# Patient Record
Sex: FEMALE | Race: WHITE | NOT HISPANIC OR LATINO | Employment: OTHER | URBAN - NONMETROPOLITAN AREA
[De-identification: names, ages, dates, MRNs, and addresses within clinical notes are randomized per-mention and may not be internally consistent; named-entity substitution may affect disease eponyms.]

---

## 2017-05-02 ENCOUNTER — OFFICE VISIT (OUTPATIENT)
Dept: CARDIOLOGY | Facility: CLINIC | Age: 72
End: 2017-05-02

## 2017-05-02 VITALS
HEIGHT: 67 IN | BODY MASS INDEX: 31.48 KG/M2 | HEART RATE: 81 BPM | SYSTOLIC BLOOD PRESSURE: 158 MMHG | OXYGEN SATURATION: 95 % | DIASTOLIC BLOOD PRESSURE: 69 MMHG | WEIGHT: 200.6 LBS

## 2017-05-02 DIAGNOSIS — Z82.49 FAMILY HISTORY OF CORONARY ARTERY DISEASE: ICD-10-CM

## 2017-05-02 DIAGNOSIS — I65.29 STENOSIS OF CAROTID ARTERY, UNSPECIFIED LATERALITY: ICD-10-CM

## 2017-05-02 DIAGNOSIS — Z92.3 S/P RADIATION THERAPY: ICD-10-CM

## 2017-05-02 DIAGNOSIS — R68.89 ANERGY: ICD-10-CM

## 2017-05-02 DIAGNOSIS — M25.511 PAIN OF BOTH SHOULDER JOINTS: ICD-10-CM

## 2017-05-02 DIAGNOSIS — Z86.73 HISTORY OF TIA (TRANSIENT ISCHEMIC ATTACK): ICD-10-CM

## 2017-05-02 DIAGNOSIS — R55 SYNCOPE, UNSPECIFIED SYNCOPE TYPE: ICD-10-CM

## 2017-05-02 DIAGNOSIS — M25.512 PAIN OF BOTH SHOULDER JOINTS: ICD-10-CM

## 2017-05-02 DIAGNOSIS — R06.02 SHORTNESS OF BREATH: ICD-10-CM

## 2017-05-02 DIAGNOSIS — I10 ESSENTIAL HYPERTENSION: ICD-10-CM

## 2017-05-02 DIAGNOSIS — E78.2 MIXED HYPERLIPIDEMIA: Primary | ICD-10-CM

## 2017-05-02 DIAGNOSIS — R42 DIZZINESS: ICD-10-CM

## 2017-05-02 DIAGNOSIS — R53.82 CHRONIC FATIGUE: ICD-10-CM

## 2017-05-02 DIAGNOSIS — E78.2 MIXED HYPERLIPIDEMIA: ICD-10-CM

## 2017-05-02 DIAGNOSIS — R09.89 RALES: ICD-10-CM

## 2017-05-02 PROBLEM — E07.9 THYROID DISEASE: Status: ACTIVE | Noted: 2017-05-02

## 2017-05-02 PROBLEM — T66.XXXA RADIATION THERAPY COMPLICATION: Status: ACTIVE | Noted: 2017-05-02

## 2017-05-02 PROBLEM — E11.9 DIABETES (HCC): Status: ACTIVE | Noted: 2017-05-02

## 2017-05-02 PROBLEM — M25.50 JOINT PAIN: Status: ACTIVE | Noted: 2017-05-02

## 2017-05-02 PROCEDURE — 99205 OFFICE O/P NEW HI 60 MIN: CPT | Performed by: INTERNAL MEDICINE

## 2017-05-02 RX ORDER — PRAVASTATIN SODIUM 20 MG
20 TABLET ORAL DAILY
COMMUNITY

## 2017-05-02 RX ORDER — ACETAMINOPHEN 160 MG
2000 TABLET,DISINTEGRATING ORAL DAILY
COMMUNITY

## 2017-05-02 RX ORDER — LISINOPRIL 10 MG/1
10 TABLET ORAL DAILY
Qty: 30 TABLET | Refills: 5 | Status: SHIPPED | OUTPATIENT
Start: 2017-05-02 | End: 2017-10-18 | Stop reason: SDUPTHER

## 2017-05-02 RX ORDER — LISINOPRIL 5 MG/1
5 TABLET ORAL DAILY
COMMUNITY
End: 2017-05-02

## 2017-05-02 RX ORDER — LEVOTHYROXINE SODIUM 88 UG/1
88 TABLET ORAL DAILY
COMMUNITY

## 2017-05-02 RX ORDER — GLIMEPIRIDE 2 MG/1
2 TABLET ORAL
COMMUNITY
End: 2019-06-20 | Stop reason: DRUGHIGH

## 2017-05-02 RX ORDER — ASPIRIN 325 MG
325 TABLET ORAL DAILY
COMMUNITY

## 2017-05-02 RX ORDER — AMLODIPINE BESYLATE 2.5 MG/1
2.5 TABLET ORAL DAILY
Qty: 30 TABLET | Refills: 11 | Status: SHIPPED | OUTPATIENT
Start: 2017-05-02 | End: 2018-02-16 | Stop reason: SDUPTHER

## 2017-05-02 RX ORDER — BRIMONIDINE TARTRATE 0.15 %
1 DROPS OPHTHALMIC (EYE) 3 TIMES DAILY
COMMUNITY
End: 2021-08-31 | Stop reason: ALTCHOICE

## 2017-05-02 RX ORDER — OMEPRAZOLE 40 MG/1
40 CAPSULE, DELAYED RELEASE ORAL DAILY
COMMUNITY

## 2017-05-11 ENCOUNTER — HOSPITAL ENCOUNTER (OUTPATIENT)
Dept: CARDIOLOGY | Facility: HOSPITAL | Age: 72
Discharge: HOME | End: 2017-05-11
Attending: INTERNAL MEDICINE

## 2017-05-11 ENCOUNTER — HOSPITAL ENCOUNTER (OUTPATIENT)
Dept: CARDIOLOGY | Facility: HOSPITAL | Age: 72
Discharge: HOME | End: 2017-05-11

## 2017-05-11 ENCOUNTER — OUTSIDE FACILITY SERVICE (OUTPATIENT)
Dept: CARDIOLOGY | Facility: CLINIC | Age: 72
End: 2017-05-11

## 2017-05-11 LAB
MAXIMAL PREDICTED HEART RATE: 147 BPM
STRESS TARGET HR: 125 BPM

## 2017-05-11 PROCEDURE — 93880 EXTRACRANIAL BILAT STUDY: CPT

## 2017-05-11 PROCEDURE — 78452 HT MUSCLE IMAGE SPECT MULT: CPT

## 2017-05-11 PROCEDURE — 25010000002 REGADENOSON 0.4 MG/5ML SOLUTION: Performed by: INTERNAL MEDICINE

## 2017-05-11 PROCEDURE — 93018 CV STRESS TEST I&R ONLY: CPT | Performed by: INTERNAL MEDICINE

## 2017-05-11 PROCEDURE — 78452 HT MUSCLE IMAGE SPECT MULT: CPT | Performed by: INTERNAL MEDICINE

## 2017-05-11 PROCEDURE — 0 TECHNETIUM SESTAMIBI: Performed by: INTERNAL MEDICINE

## 2017-05-11 PROCEDURE — 93017 CV STRESS TEST TRACING ONLY: CPT

## 2017-05-11 PROCEDURE — 93880 EXTRACRANIAL BILAT STUDY: CPT | Performed by: INTERNAL MEDICINE

## 2017-05-11 PROCEDURE — 93306 TTE W/DOPPLER COMPLETE: CPT | Performed by: INTERNAL MEDICINE

## 2017-05-11 PROCEDURE — A9500 TC99M SESTAMIBI: HCPCS | Performed by: INTERNAL MEDICINE

## 2017-05-11 RX ADMIN — Medication 1 DOSE: at 13:45

## 2017-05-11 RX ADMIN — REGADENOSON 0.4 MG: 0.08 INJECTION, SOLUTION INTRAVENOUS at 13:45

## 2017-05-15 ENCOUNTER — DOCUMENTATION (OUTPATIENT)
Dept: CARDIOLOGY | Facility: CLINIC | Age: 72
End: 2017-05-15

## 2017-05-16 ENCOUNTER — DOCUMENTATION (OUTPATIENT)
Dept: CARDIOLOGY | Facility: CLINIC | Age: 72
End: 2017-05-16

## 2017-05-18 ENCOUNTER — OUTSIDE FACILITY SERVICE (OUTPATIENT)
Dept: CARDIOLOGY | Facility: CLINIC | Age: 72
End: 2017-05-18

## 2017-05-18 PROCEDURE — 93228 REMOTE 30 DAY ECG REV/REPORT: CPT | Performed by: INTERNAL MEDICINE

## 2017-05-24 ENCOUNTER — TELEPHONE (OUTPATIENT)
Dept: CARDIOLOGY | Facility: CLINIC | Age: 72
End: 2017-05-24

## 2017-05-24 DIAGNOSIS — R09.89 BILATERAL CAROTID BRUITS: ICD-10-CM

## 2017-05-24 DIAGNOSIS — R94.39 ABNORMAL CAROTID DUPLEX SCAN: Primary | ICD-10-CM

## 2017-05-24 DIAGNOSIS — Z79.899 LONG TERM USE OF DRUG: ICD-10-CM

## 2017-06-19 ENCOUNTER — OFFICE VISIT (OUTPATIENT)
Dept: CARDIOLOGY | Facility: CLINIC | Age: 72
End: 2017-06-19

## 2017-06-19 VITALS
DIASTOLIC BLOOD PRESSURE: 64 MMHG | SYSTOLIC BLOOD PRESSURE: 119 MMHG | WEIGHT: 199.2 LBS | HEIGHT: 67 IN | HEART RATE: 72 BPM | BODY MASS INDEX: 31.27 KG/M2 | OXYGEN SATURATION: 98 %

## 2017-06-19 DIAGNOSIS — R06.02 SHORTNESS OF BREATH: ICD-10-CM

## 2017-06-19 DIAGNOSIS — E78.2 MIXED HYPERLIPIDEMIA: ICD-10-CM

## 2017-06-19 DIAGNOSIS — I65.23 BILATERAL CAROTID ARTERY STENOSIS: ICD-10-CM

## 2017-06-19 DIAGNOSIS — R42 DIZZINESS: ICD-10-CM

## 2017-06-19 DIAGNOSIS — Z86.73 HISTORY OF TIA (TRANSIENT ISCHEMIC ATTACK): ICD-10-CM

## 2017-06-19 DIAGNOSIS — I10 ESSENTIAL HYPERTENSION: Primary | ICD-10-CM

## 2017-06-19 DIAGNOSIS — R53.82 CHRONIC FATIGUE: ICD-10-CM

## 2017-06-19 DIAGNOSIS — R55 SYNCOPE, UNSPECIFIED SYNCOPE TYPE: ICD-10-CM

## 2017-06-19 PROCEDURE — 99214 OFFICE O/P EST MOD 30 MIN: CPT | Performed by: INTERNAL MEDICINE

## 2017-10-18 RX ORDER — LISINOPRIL 10 MG/1
TABLET ORAL
Qty: 90 TABLET | Refills: 5 | Status: SHIPPED | OUTPATIENT
Start: 2017-10-18 | End: 2018-10-22 | Stop reason: SDUPTHER

## 2018-02-19 RX ORDER — AMLODIPINE BESYLATE 2.5 MG/1
TABLET ORAL
Qty: 90 TABLET | Refills: 11 | Status: SHIPPED | OUTPATIENT
Start: 2018-02-19 | End: 2019-03-20 | Stop reason: SDUPTHER

## 2018-06-19 ENCOUNTER — OFFICE VISIT (OUTPATIENT)
Dept: CARDIOLOGY | Facility: CLINIC | Age: 73
End: 2018-06-19

## 2018-06-19 VITALS
HEIGHT: 67 IN | WEIGHT: 194.2 LBS | OXYGEN SATURATION: 96 % | DIASTOLIC BLOOD PRESSURE: 64 MMHG | BODY MASS INDEX: 30.48 KG/M2 | HEART RATE: 71 BPM | SYSTOLIC BLOOD PRESSURE: 109 MMHG

## 2018-06-19 DIAGNOSIS — R01.1 SYSTOLIC EJECTION MURMUR: ICD-10-CM

## 2018-06-19 DIAGNOSIS — R06.02 SHORTNESS OF BREATH: ICD-10-CM

## 2018-06-19 DIAGNOSIS — R53.82 CHRONIC FATIGUE: ICD-10-CM

## 2018-06-19 DIAGNOSIS — I10 ESSENTIAL HYPERTENSION: Primary | ICD-10-CM

## 2018-06-19 DIAGNOSIS — E78.2 MIXED HYPERLIPIDEMIA: ICD-10-CM

## 2018-06-19 DIAGNOSIS — R42 DIZZINESS: ICD-10-CM

## 2018-06-19 DIAGNOSIS — R94.31 LEFT AXIS DEVIATION: ICD-10-CM

## 2018-06-19 PROCEDURE — 99214 OFFICE O/P EST MOD 30 MIN: CPT | Performed by: INTERNAL MEDICINE

## 2018-06-19 PROCEDURE — 93000 ELECTROCARDIOGRAM COMPLETE: CPT | Performed by: INTERNAL MEDICINE

## 2018-06-19 NOTE — PROGRESS NOTES
Subjective   Deborah Corrales is a 72 y.o. female     Chief Complaint   Patient presents with   • Hypertension     presents as a 1 year follow up   • Hyperlipidemia       PROBLEM LIST:   1. Fatigue   2. Hyperlipidemia   3. Hypertension   4. Thyroid disease   5. H/o TIA  6. Diabetes   7. Family H/o CAD   8. Carotid artery stenosis   8.1 carotid duplex 05/02/17 Potentially hemodynamically significant disease in the proximal right internal carotid artery. Although stenosis appears more the range of 50% by echo and Doppler sampling, Doppler velocities are compatible with stenosis in the range described above. Consider CTA for further Evaluation.  8.2 CTA neck 06/02/17 stenosis HECTOR approximal  50%, stenosis LICA less than 40%. No flow identified in right vertebral artery, left vertebral artery is patent and extends to fill the basilar artery.          Specialty Problems        Cardiology Problems    Carotid artery stenosis        Essential hypertension        Mixed hyperlipidemia        Syncope                HPI:  Ms. new returns for follow-up on the above problems.    She has done reasonably well since the time of her last visit.  She feels she has stable to perhaps slightly improved physical capacity and exertional dyspnea.  She currently denies chest pain.  She relates orthopnea PND or significant lower extremity edema.  She doesn't palpitate, but she continues to have positional dizziness which is somewhat improved with head positioning exercises to to extinguish positional vertigo.    Ms. Corrales Lost 5 pounds since time of her last visit.  She relates this to episodic nausea and vomiting postprandially.  Apparently workup for a specific cause was unremarkable.  She continues to be without symptoms of cerebral ischemia or other symptoms of peripheral arterial disease or of arterial embolic events.              CURRENT MEDICATION:    Current Outpatient Prescriptions   Medication Sig Dispense Refill   • amLODIPine (NORVASC)  2.5 MG tablet TAKE 1 TABLET EVERY DAY 90 tablet 11   • aspirin 325 MG tablet Take 325 mg by mouth Daily.     • bimatoprost (LUMIGAN) 0.01 % ophthalmic drops 1 drop Every Night.     • brimonidine (ALPHAGAN) 0.15 % ophthalmic solution 1 drop 3 (Three) Times a Day.     • cholecalciferol (VITAMIN D3) 1000 UNITS tablet Take 1,000 Units by mouth Daily.     • glimepiride (AMARYL) 2 MG tablet Take 2 mg by mouth Every Morning Before Breakfast. 1.5 tablets daily     • levothyroxine (SYNTHROID, LEVOTHROID) 88 MCG tablet Take 88 mcg by mouth Daily.     • lisinopril (PRINIVIL,ZESTRIL) 10 MG tablet TAKE 1 TABLET EVERY DAY 90 tablet 5   • omeprazole (priLOSEC) 40 MG capsule Take 40 mg by mouth Daily.     • pravastatin (PRAVACHOL) 20 MG tablet Take 20 mg by mouth Daily.       No current facility-administered medications for this visit.        ALLERGIES:    Patient has no known allergies.    PAST MEDICAL HISTORY:    Past Medical History:   Diagnosis Date   • Cancer    • Diabetes mellitus    • GERD (gastroesophageal reflux disease)    • Hyperlipidemia    • Hypertension    • Hypothyroidism    • Non Hodgkin's lymphoma    • Non Hodgkin's lymphoma    • Rupture of bowel     2014    • TIA (transient ischemic attack)        SURGICAL HISTORY:    Past Surgical History:   Procedure Laterality Date   • CATARACT EXTRACTION     • CHOLECYSTECTOMY OPEN     • EYE SURGERY     • TUMOR REMOVAL      under jaw       SOCIAL HISTORY:    Social History     Social History   • Marital status: Single     Spouse name: N/A   • Number of children: N/A   • Years of education: N/A     Occupational History   • Not on file.     Social History Main Topics   • Smoking status: Never Smoker   • Smokeless tobacco: Never Used   • Alcohol use No   • Drug use: No   • Sexual activity: Defer     Other Topics Concern   • Not on file     Social History Narrative   • No narrative on file       FAMILY HISTORY:    Family History   Problem Relation Age of Onset   • Heart disease  "Mother    • COPD Mother    • Pancreatic cancer Father    • Heart disease Maternal Uncle        Review of Systems   Constitutional: Positive for fatigue. Negative for chills, diaphoresis and fever.   HENT: Positive for trouble swallowing. Negative for congestion, nosebleeds, postnasal drip, rhinorrhea, sinus pain and sinus pressure.    Eyes: Positive for visual disturbance (glasses prn).   Respiratory: Positive for shortness of breath (mainly on exertion ) and wheezing ( occasional at night). Negative for apnea and chest tightness.    Cardiovascular: Negative.  Negative for chest pain (denies CP), palpitations (denies palps) and leg swelling.   Gastrointestinal: Positive for abdominal pain, constipation and vomiting. Negative for blood in stool, diarrhea and nausea.        GERD   Endocrine: Negative.  Negative for cold intolerance and heat intolerance.   Genitourinary: Negative.  Negative for hematuria.   Musculoskeletal: Positive for arthralgias (neck), myalgias (legs at night occas in toes), neck pain (neck spurs ) and neck stiffness. Negative for back pain, gait problem and joint swelling.   Skin: Negative.  Negative for color change, pallor, rash and wound.   Allergic/Immunologic: Positive for environmental allergies. Negative for food allergies.   Neurological: Positive for dizziness (episode last month with quick movement - has head exercise for relief) and numbness (occas in hands). Negative for tremors, seizures, syncope, speech difficulty, weakness, light-headedness and headaches.   Hematological: Negative.  Does not bruise/bleed easily.   Psychiatric/Behavioral: Negative for agitation, confusion and sleep disturbance (denies waking up smothering/soa). The patient is not nervous/anxious.        VISIT VITALS:  Vitals:    06/19/18 0921   BP: 109/64   BP Location: Left arm   Patient Position: Sitting   Pulse: 71   SpO2: 96%   Weight: 88.1 kg (194 lb 3.2 oz)   Height: 169.2 cm (66.6\")      /64 (BP " "Location: Left arm, Patient Position: Sitting)   Pulse 71   Ht 169.2 cm (66.6\")   Wt 88.1 kg (194 lb 3.2 oz)   SpO2 96%   BMI 30.78 kg/m²     RECENT LABS:    Objective       Physical Exam   Constitutional: She is oriented to person, place, and time. She appears well-developed and well-nourished. No distress.   HENT:   Head: Normocephalic and atraumatic.   Eyes: Conjunctivae, EOM and lids are normal. Pupils are equal, round, and reactive to light. Lids are everted and swept, no foreign bodies found.   Neck: Normal range of motion. Neck supple. Normal carotid pulses, no hepatojugular reflux and no JVD present. Carotid bruit is not present. No tracheal deviation present. No thyroid mass and no thyromegaly present.   Cardiovascular: Normal rate, regular rhythm, S2 normal and intact distal pulses.  Exam reveals gallop and S4. Exam reveals no S3, no distant heart sounds and no friction rub.    Murmur (No AI or MR by exam ) heard.   Systolic (1-2/6 KENDALL RUSB) murmur is present   Pulses:       Carotid pulses are 2+ on the right side, and 2+ on the left side.       Radial pulses are 2+ on the right side, and 2+ on the left side.        Dorsalis pedis pulses are 2+ on the right side, and 2+ on the left side.        Posterior tibial pulses are 2+ on the right side, and 2+ on the left side.   S1 decreased      Pulmonary/Chest: Effort normal and breath sounds normal. She has no decreased breath sounds. She has no wheezes. She has no rhonchi. She has no rales.   Abdominal: Soft. Bowel sounds are normal. She exhibits no distension, no pulsatile liver, no abdominal bruit, no pulsatile midline mass and no mass. There is no tenderness. There is no rebound and no guarding.   Negative organomegaly      Musculoskeletal: Normal range of motion. She exhibits no edema (trace edema BLE), tenderness or deformity.   Lymphadenopathy:     She has no cervical adenopathy.     She has no axillary adenopathy.   Neurological: She is alert and " oriented to person, place, and time.   Skin: Skin is warm and dry. No rash noted. She is not diaphoretic. No erythema. No pallor.   Psychiatric: She has a normal mood and affect. Her speech is normal and behavior is normal. Judgment and thought content normal. Cognition and memory are normal.   Nursing note and vitals reviewed.        ECG 12 Lead  Date/Time: 6/19/2018 9:38 AM  Performed by: JAMESON FRANKS  Authorized by: JAMESON FRANKS   Rhythm: sinus rhythm  Rate: normal  Conduction: LAFB  QRS axis: left                Assessment/Plan   #1.  Peripheral arterial disease with previous evaluation as described above.  The patient is asymptomatic from the standpoint of cerebral ischemia.  Blood pressures are controlled.  Consideration could be given to changing to high-dose statin with known peripheral arterial disease.  No further evaluation is indicated at this time in the absence of progressive symptoms.      #2.  Ms. miguel is also doing well from the perspective of cardiac risk factor modification.  Blood pressures are well controlled, medications are appropriate (see #1 above).    #3.  Therefore, we will make no changes.  Ms. miguel was encouraged to attempt to maintain her increase her physical capacity as a part of long-term cardiac risk factor modification.  She will follow-up with Dr. Nye per his instructions, and in our office in one year or on a when necessary basis for symptoms as discussed in detail today.   Diagnosis Plan   1. Essential hypertension  ECG 12 Lead   2. Mixed hyperlipidemia     3. Chronic fatigue     4. Shortness of breath     5. Dizziness     6. Left axis deviation     7. Systolic ejection murmur         Return in about 1 year (around 6/19/2019), or if symptoms worsen or fail to improve, for Next scheduled follow up.            Patient's Body mass index is 30.78 kg/m². BMI is above normal parameters. Recommendations include: educational material.       Jameson Franks MD   Scribed  for Dr. Jameson Franks by VITOR Mcbride June 19, 2018 7:20 PM               Electronically signed by:            This note is dictated utilizing voice recognition software.  Although this record has been proof read, transcriptional errors may still be present. If questions occur regarding the content of this record please do not hesitate to call our office.

## 2018-06-19 NOTE — PATIENT INSTRUCTIONS
Heart-Healthy Eating Plan  Many factors influence your heart health, including eating and exercise habits. Heart (coronary) risk increases with abnormal blood fat (lipid) levels. Heart-healthy meal planning includes limiting unhealthy fats, increasing healthy fats, and making other small dietary changes. This includes maintaining a healthy body weight to help keep lipid levels within a normal range.  What is my plan?  Your health care provider recommends that you:  · Get no more than _________% of the total calories in your daily diet from fat.  · Limit your intake of saturated fat to less than _________% of your total calories each day.  · Limit the amount of cholesterol in your diet to less than _________ mg per day.    What types of fat should I choose?  · Choose healthy fats more often. Choose monounsaturated and polyunsaturated fats, such as olive oil and canola oil, flaxseeds, walnuts, almonds, and seeds.  · Eat more omega-3 fats. Good choices include salmon, mackerel, sardines, tuna, flaxseed oil, and ground flaxseeds. Aim to eat fish at least two times each week.  · Limit saturated fats. Saturated fats are primarily found in animal products, such as meats, butter, and cream. Plant sources of saturated fats include palm oil, palm kernel oil, and coconut oil.  · Avoid foods with partially hydrogenated oils in them. These contain trans fats. Examples of foods that contain trans fats are stick margarine, some tub margarines, cookies, crackers, and other baked goods.  What general guidelines do I need to follow?  · Check food labels carefully to identify foods with trans fats or high amounts of saturated fat.  · Fill one half of your plate with vegetables and green salads. Eat 4-5 servings of vegetables per day. A serving of vegetables equals 1 cup of raw leafy vegetables, ½ cup of raw or cooked cut-up vegetables, or ½ cup of vegetable juice.  · Fill one fourth of your plate with whole grains. Look for the word  "\"whole\" as the first word in the ingredient list.  · Fill one fourth of your plate with lean protein foods.  · Eat 4-5 servings of fruit per day. A serving of fruit equals one medium whole fruit, ¼ cup of dried fruit, ½ cup of fresh, frozen, or canned fruit, or ½ cup of 100% fruit juice.  · Eat more foods that contain soluble fiber. Examples of foods that contain this type of fiber are apples, broccoli, carrots, beans, peas, and barley. Aim to get 20-30 g of fiber per day.  · Eat more home-cooked food and less restaurant, buffet, and fast food.  · Limit or avoid alcohol.  · Limit foods that are high in starch and sugar.  · Avoid fried foods.  · Cook foods by using methods other than frying. Baking, boiling, grilling, and broiling are all great options. Other fat-reducing suggestions include:  ? Removing the skin from poultry.  ? Removing all visible fats from meats.  ? Skimming the fat off of stews, soups, and gravies before serving them.  ? Steaming vegetables in water or broth.  · Lose weight if you are overweight. Losing just 5-10% of your initial body weight can help your overall health and prevent diseases such as diabetes and heart disease.  · Increase your consumption of nuts, legumes, and seeds to 4-5 servings per week. One serving of dried beans or legumes equals ½ cup after being cooked, one serving of nuts equals 1½ ounces, and one serving of seeds equals ½ ounce or 1 tablespoon.  · You may need to monitor your salt (sodium) intake, especially if you have high blood pressure. Talk with your health care provider or dietitian to get more information about reducing sodium.  What foods can I eat?  Grains    Breads, including [place] Islander, white, maryanne, wheat, raisin, rye, oatmeal, and Italian. Tortillas that are neither fried nor made with lard or trans fat. Low-fat rolls, including hotdog and hamburger buns and English muffins. Biscuits. Muffins. Waffles. Pancakes. Light popcorn. Whole-grain cereals. Flatbread. " Benita toast. Pretzels. Breadsticks. Rusks. Low-fat snacks and crackers, including oyster, saltine, matzo, mckenna, animal, and rye. Rice and pasta, including brown rice and those that are made with whole wheat.  Vegetables  All vegetables.  Fruits  All fruits, but limit coconut.  Meats and Other Protein Sources  Lean, well-trimmed beef, veal, pork, and lamb. Chicken and turkey without skin. All fish and shellfish. Wild duck, rabbit, pheasant, and venison. Egg whites or low-cholesterol egg substitutes. Dried beans, peas, lentils, and tofu. Seeds and most nuts.  Dairy  Low-fat or nonfat cheeses, including ricotta, string, and mozzarella. Skim or 1% milk that is liquid, powdered, or evaporated. Buttermilk that is made with low-fat milk. Nonfat or low-fat yogurt.  Beverages  Mineral water. Diet carbonated beverages.  Sweets and Desserts  Sherbets and fruit ices. Honey, jam, marmalade, jelly, and syrups. Meringues and gelatins. Pure sugar candy, such as hard candy, jelly beans, gumdrops, mints, marshmallows, and small amounts of dark chocolate. Cirilo food cake.  Eat all sweets and desserts in moderation.  Fats and Oils  Nonhydrogenated (trans-free) margarines. Vegetable oils, including soybean, sesame, sunflower, olive, peanut, safflower, corn, canola, and cottonseed. Salad dressings or mayonnaise that are made with a vegetable oil. Limit added fats and oils that you use for cooking, baking, salads, and as spreads.  Other  Cocoa powder. Coffee and tea. All seasonings and condiments.  The items listed above may not be a complete list of recommended foods or beverages. Contact your dietitian for more options.  What foods are not recommended?  Grains  Breads that are made with saturated or trans fats, oils, or whole milk. Croissants. Butter rolls. Cheese breads. Sweet rolls. Donuts. Buttered popcorn. Chow mein noodles. High-fat crackers, such as cheese or butter crackers.  Meats and Other Protein Sources  Fatty meats, such  as hotdogs, short ribs, sausage, spareribs, guevara, ribeye roast or steak, and mutton. High-fat deli meats, such as salami and bologna. Caviar. Domestic duck and goose. Organ meats, such as kidney, liver, sweetbreads, brains, gizzard, chitterlings, and heart.  Dairy  Cream, sour cream, cream cheese, and creamed cottage cheese. Whole milk cheeses, including blue (julio), [place] Rubén, Brie, James, American, Havarti, Swiss, cheddar, Camembert, and [place]. Whole or 2% milk that is liquid, evaporated, or condensed. Whole buttermilk. Cream sauce or high-fat cheese sauce. Yogurt that is made from whole milk.  Beverages  Regular sodas and drinks with added sugar.  Sweets and Desserts  Frosting. Pudding. Cookies. Cakes other than henry food cake. Candy that has milk chocolate or white chocolate, hydrogenated fat, butter, coconut, or unknown ingredients. Buttered syrups. Full-fat ice cream or ice cream drinks.  Fats and Oils  Gravy that has suet, meat fat, or shortening. Cocoa butter, hydrogenated oils, palm oil, coconut oil, palm kernel oil. These can often be found in baked products, candy, fried foods, nondairy creamers, and whipped toppings. Solid fats and shortenings, including guevara fat, salt pork, lard, and butter. Nondairy cream substitutes, such as coffee creamers and sour cream substitutes. Salad dressings that are made of unknown oils, cheese, or sour cream.  The items listed above may not be a complete list of foods and beverages to avoid. Contact your dietitian for more information.  This information is not intended to replace advice given to you by your health care provider. Make sure you discuss any questions you have with your health care provider.  Document Released: 09/26/2009 Document Revised: 07/07/2017 Document Reviewed: 06/11/2015  [employer] Patient Education © 2018 [employer]

## 2018-10-23 RX ORDER — LISINOPRIL 10 MG/1
TABLET ORAL
Qty: 90 TABLET | Refills: 5 | Status: SHIPPED | OUTPATIENT
Start: 2018-10-23 | End: 2019-11-27 | Stop reason: SDUPTHER

## 2019-03-20 RX ORDER — AMLODIPINE BESYLATE 2.5 MG/1
TABLET ORAL
Qty: 90 TABLET | Refills: 11 | Status: SHIPPED | OUTPATIENT
Start: 2019-03-20 | End: 2020-04-13

## 2019-06-20 ENCOUNTER — RESULTS ENCOUNTER (OUTPATIENT)
Dept: CARDIOLOGY | Facility: CLINIC | Age: 74
End: 2019-06-20

## 2019-06-20 ENCOUNTER — OFFICE VISIT (OUTPATIENT)
Dept: CARDIOLOGY | Facility: CLINIC | Age: 74
End: 2019-06-20

## 2019-06-20 VITALS
DIASTOLIC BLOOD PRESSURE: 69 MMHG | WEIGHT: 186 LBS | HEIGHT: 66 IN | BODY MASS INDEX: 29.89 KG/M2 | SYSTOLIC BLOOD PRESSURE: 128 MMHG | HEART RATE: 70 BPM | OXYGEN SATURATION: 96 %

## 2019-06-20 DIAGNOSIS — I10 ESSENTIAL HYPERTENSION: ICD-10-CM

## 2019-06-20 DIAGNOSIS — I65.23 BILATERAL CAROTID ARTERY STENOSIS: ICD-10-CM

## 2019-06-20 DIAGNOSIS — R53.82 CHRONIC FATIGUE: ICD-10-CM

## 2019-06-20 DIAGNOSIS — R55 SYNCOPE, UNSPECIFIED SYNCOPE TYPE: ICD-10-CM

## 2019-06-20 DIAGNOSIS — Z86.73 HISTORY OF TIA (TRANSIENT ISCHEMIC ATTACK): ICD-10-CM

## 2019-06-20 DIAGNOSIS — Z82.49 FAMILY HISTORY OF CORONARY ARTERY DISEASE: ICD-10-CM

## 2019-06-20 DIAGNOSIS — R06.02 SHORTNESS OF BREATH: ICD-10-CM

## 2019-06-20 DIAGNOSIS — R42 DIZZINESS: ICD-10-CM

## 2019-06-20 DIAGNOSIS — E78.2 MIXED HYPERLIPIDEMIA: ICD-10-CM

## 2019-06-20 DIAGNOSIS — I65.23 BILATERAL CAROTID ARTERY STENOSIS: Primary | ICD-10-CM

## 2019-06-20 DIAGNOSIS — R01.1 SYSTOLIC EJECTION MURMUR: ICD-10-CM

## 2019-06-20 PROBLEM — R94.31 LEFT AXIS DEVIATION: Status: RESOLVED | Noted: 2018-06-19 | Resolved: 2019-06-20

## 2019-06-20 PROCEDURE — 93000 ELECTROCARDIOGRAM COMPLETE: CPT | Performed by: INTERNAL MEDICINE

## 2019-06-20 PROCEDURE — 99214 OFFICE O/P EST MOD 30 MIN: CPT | Performed by: INTERNAL MEDICINE

## 2019-06-20 RX ORDER — GLIMEPIRIDE 4 MG/1
TABLET ORAL DAILY
COMMUNITY

## 2019-06-20 NOTE — PROGRESS NOTES
Subjective   Deborah Miguel is a 73 y.o. female     Chief Complaint   Patient presents with   • Hypertension     Here for yearly f/u   • Hyperlipidemia   • Loss of Consciousness   • Transient Ischemic Attack       PROBLEM LIST:     1. Fatigue   2. Hyperlipidemia   3. Hypertension   4. Thyroid disease   5. H/o TIA  6. Diabetes   7. Family H/o CAD   8. Carotid artery stenosis   8.1 carotid duplex 05/02/17 Potentially hemodynamically significant disease in the proximal right internal carotid artery. Although stenosis appears more the range of 50% by echo and Doppler sampling, Doppler velocities are compatible with stenosis in the range described above. Consider CTA for further Evaluation.  8.2 CTA neck 06/02/17 stenosis HECTOR approximal  50%, stenosis LICA less than 40%. No flow identified in right vertebral artery, left vertebral artery is patent and extends to fill the basilar artery.                 Specialty Problems        Cardiology Problems    Carotid artery stenosis        Essential hypertension        Mixed hyperlipidemia        Syncope        Left axis deviation        Systolic ejection murmur                HPI:  Ms. new returns for follow-up on the above problems.    She has been clinically stable since the time of her last visit.  She denies any type of chest discomfort, and her strength and stamina have remained stable.  She still describes episodes of intermittent dizziness and sometimes occasional vertigo.  She also has mild orthostatic lightheadedness.  She will either perform head maneuvers, or get physical therapy and those symptoms resolved.  She denies orthopnea, PND, or or lower extremity edema.  She does not claudicate and she has no symptoms of TIA or stroke.    Ms. miguel does not check blood pressures regularly but she states that her pressures have been well controlled when checked in various doctor's offices.  The patient has not had lipids checked for some period of time.  She states she is  compliant with her medications, and she continues to lose weight slowly which she relates to her postprandial nausea and vomiting which is never been given an etiology.                    CURRENT MEDICATION:    Current Outpatient Medications   Medication Sig Dispense Refill   • amLODIPine (NORVASC) 2.5 MG tablet TAKE 1 TABLET EVERY DAY 90 tablet 11   • aspirin 325 MG tablet Take 325 mg by mouth Daily.     • bimatoprost (LUMIGAN) 0.01 % ophthalmic drops 1 drop Every Night.     • brimonidine (ALPHAGAN) 0.15 % ophthalmic solution 1 drop 3 (Three) Times a Day.     • cholecalciferol (VITAMIN D3) 1000 UNITS tablet Take 1,000 Units by mouth Daily.     • glimepiride (AMARYL) 4 MG tablet Daily.     • levothyroxine (SYNTHROID, LEVOTHROID) 88 MCG tablet Take 88 mcg by mouth Daily.     • lisinopril (PRINIVIL,ZESTRIL) 10 MG tablet TAKE 1 TABLET EVERY DAY 90 tablet 5   • omeprazole (priLOSEC) 40 MG capsule Take 40 mg by mouth Daily.     • pravastatin (PRAVACHOL) 20 MG tablet Take 20 mg by mouth Daily.       No current facility-administered medications for this visit.        ALLERGIES:    Patient has no known allergies.    PAST MEDICAL HISTORY:    Past Medical History:   Diagnosis Date   • Cancer (CMS/HCC)    • Diabetes mellitus (CMS/HCC)    • GERD (gastroesophageal reflux disease)    • Hyperlipidemia    • Hypertension    • Hypothyroidism    • Non Hodgkin's lymphoma (CMS/HCC)    • Non Hodgkin's lymphoma (CMS/HCC)    • Rupture of bowel (CMS/HCC)     2014    • TIA (transient ischemic attack)        SURGICAL HISTORY:    Past Surgical History:   Procedure Laterality Date   • CATARACT EXTRACTION     • CHOLECYSTECTOMY OPEN     • EYE SURGERY     • TUMOR REMOVAL      under jaw       SOCIAL HISTORY:    Social History     Socioeconomic History   • Marital status: Single     Spouse name: Not on file   • Number of children: Not on file   • Years of education: Not on file   • Highest education level: Not on file   Tobacco Use   • Smoking  "status: Never Smoker   • Smokeless tobacco: Never Used   Substance and Sexual Activity   • Alcohol use: No   • Drug use: No   • Sexual activity: Defer       FAMILY HISTORY:    Family History   Problem Relation Age of Onset   • Heart disease Mother    • COPD Mother    • Pancreatic cancer Father    • Heart disease Maternal Uncle        Review of Systems   Constitutional: Positive for fatigue.   HENT: Positive for congestion (head,chest,throat).    Eyes: Positive for visual disturbance (glasses prn).   Respiratory: Positive for shortness of breath.         Denies orthopnea/PND   Cardiovascular: Positive for palpitations (with occas. dizziness episodes).   Gastrointestinal: Positive for constipation.   Endocrine: Negative.    Genitourinary: Negative.    Musculoskeletal: Positive for arthralgias and myalgias.        Denies leg cramps with ambulation   Skin: Negative.    Allergic/Immunologic: Positive for environmental allergies.   Neurological: Positive for dizziness (occas.).        Denies stroke like sx's   Hematological: Negative.    Psychiatric/Behavioral: Negative.        VISIT VITALS:  Vitals:    06/20/19 0935   BP: 128/69   BP Location: Left arm   Patient Position: Sitting   Pulse: 70   SpO2: 96%   Weight: 84.4 kg (186 lb)   Height: 167.6 cm (66\")      /69 (BP Location: Left arm, Patient Position: Sitting)   Pulse 70   Ht 167.6 cm (66\")   Wt 84.4 kg (186 lb)   SpO2 96%   BMI 30.02 kg/m²     RECENT LABS:    Objective       Physical Exam   Constitutional: She is oriented to person, place, and time. She appears well-developed and well-nourished. No distress.   HENT:   Head: Normocephalic and atraumatic.   Eyes: Conjunctivae and EOM are normal. Pupils are equal, round, and reactive to light.   Neck: Normal range of motion. Neck supple. No hepatojugular reflux and no JVD present. Carotid bruit is not present. No tracheal deviation present.   Nl. Carotid upstrokes     Cardiovascular: Normal rate, regular " rhythm, S1 normal, S2 normal and intact distal pulses.  Extrasystoles are present. Exam reveals gallop and S4. Exam reveals no S3.   Murmur heard.   Systolic murmur is present.  Pulses:       Radial pulses are 2+ on the right side, and 2+ on the left side.   1-2/6 systolic ejection murmur RUSB  No MR     Pulmonary/Chest: Effort normal and breath sounds normal. She has no wheezes. She has no rhonchi. She has no rales.   Nl. Expir. Phase  Nl. Breath sound intensity     Abdominal: Soft. Bowel sounds are normal. She exhibits no distension, no abdominal bruit and no mass. There is no tenderness. There is no rebound and no guarding.   No organomegaly   Musculoskeletal: Normal range of motion. She exhibits edema. She exhibits no tenderness or deformity.   LLE, trace to hardly any edema at all, palpable pedal pulses  RLE, trace edema, nl. Cap. Refill, palpable pedal pulses   Neurological: She is alert and oriented to person, place, and time.   Skin: Skin is warm and dry. No rash noted. No erythema. No pallor.   Psychiatric: She has a normal mood and affect. Her behavior is normal. Judgment and thought content normal.   Nursing note and vitals reviewed.        ECG 12 Lead  Date/Time: 6/20/2019 9:41 AM  Performed by: Jameson Franks MD  Authorized by: Jameson Franks MD   Comments: Sinus rhythm with PACs.  Left axis deviation.  Prominent R waves in V1 through V3 suggestive of right ventricular hypertrophy pulmonary disease pattern.              Assessment/Plan   #1.  Dizziness.  This does not appear to have a cardiovascular cause based on the patient's description of symptoms, lack of clear-cut association with palpitations, and improvement with noncardiac measures.  No further evaluation is indicated.    2.  Treated dyslipidemia.  We will check fasting lipid profile    3.  Controlled systemic hypertension.    4.  She due indication for further evaluation from a cardiac standpoint given stable functional capacity and  lack of symptoms directly referable to radiate pathology.  Therefore, Ms. miguel will continue her medications, we will continue her efforts at her size and weight loss, and will follow with Dr. Nye as instructed.  We will plan on seeing the patient in follow-up on a yearly basis or on a as needed basis as discussed.   Diagnosis Plan   1. Bilateral carotid artery stenosis     2. Essential hypertension     3. Mixed hyperlipidemia     4. Syncope, unspecified syncope type     5. Systolic ejection murmur     6. Shortness of breath     7. Chronic fatigue     8. Dizziness     9. Family history of coronary artery disease     10. History of TIA (transient ischemic attack)         No Follow-up on file.            Patient's Body mass index is 30.02 kg/m². BMI is above normal parameters. Recommendations include: educational material and referral to primary care.       Debbie Fabian LPN    Scribed for Dr. Jameson Franks by Debbie Fabian LPN June 20, 2019 9:40 AM         Electronically signed by:            This note is dictated utilizing voice recognition software.  Although this record has been proof read, transcriptional errors may still be present. If questions occur regarding the content of this record please do not hesitate to call our office.

## 2019-06-20 NOTE — PATIENT INSTRUCTIONS
Obesity, Adult  Obesity is the condition of having too much total body fat. Being overweight or obese means that your weight is greater than what is considered healthy for your body size. Obesity is determined by a measurement called BMI. BMI is an estimate of body fat and is calculated from height and weight. For adults, a BMI of 30 or higher is considered obese.  Obesity can eventually lead to other health concerns and major illnesses, including:  · Stroke.  · Coronary artery disease (CAD).  · Type 2 diabetes.  · Some types of cancer, including cancers of the colon, breast, uterus, and gallbladder.  · Osteoarthritis.  · High blood pressure (hypertension).  · High cholesterol.  · Sleep apnea.  · Gallbladder stones.  · Infertility problems.    What are the causes?  The main cause of obesity is taking in (consuming) more calories than your body uses for energy. Other factors that contribute to this condition may include:  · Being born with genes that make you more likely to become obese.  · Having a medical condition that causes obesity. These conditions include:  ? Hypothyroidism.  ? Polycystic ovarian syndrome (PCOS).  ? Binge-eating disorder.  ? Cushing syndrome.  · Taking certain medicines, such as steroids, antidepressants, and seizure medicines.  · Not being physically active (sedentary lifestyle).  · Living where there are limited places to exercise safely or buy healthy foods.  · Not getting enough sleep.    What increases the risk?  The following factors may increase your risk of this condition:  · Having a family history of obesity.  · Being a woman of -American descent.  · Being a man of  descent.    What are the signs or symptoms?  Having excessive body fat is the main symptom of this condition.  How is this diagnosed?  This condition may be diagnosed based on:  · Your symptoms.  · Your medical history.  · A physical exam. Your health care provider may measure:  ? Your BMI. If you are an  adult with a BMI between 25 and less than 30, you are considered overweight. If you are an adult with a BMI of 30 or higher, you are considered obese.  ? The distances around your hips and your waist (circumferences). These may be compared to each other to help diagnose your condition.  ? Your skinfold thickness. Your health care provider may gently pinch a fold of your skin and measure it.    How is this treated?  Treatment for this condition often includes changing your lifestyle. Treatment may include some or all of the following:  · Dietary changes. Work with your health care provider and a dietitian to set a weight-loss goal that is healthy and reasonable for you. Dietary changes may include eating:  ? Smaller portions. A portion size is the amount of a particular food that is healthy for you to eat at one time. This varies from person to person.  ? Low-calorie or low-fat options.  ? More whole grains, fruits, and vegetables.  · Regular physical activity. This may include aerobic activity (cardio) and strength training.  · Medicine to help you lose weight. Your health care provider may prescribe medicine if you are unable to lose 1 pound a week after 6 weeks of eating more healthily and doing more physical activity.  · Surgery. Surgical options may include gastric banding and gastric bypass. Surgery may be done if:  ? Other treatments have not helped to improve your condition.  ? You have a BMI of 40 or higher.  ? You have life-threatening health problems related to obesity.    Follow these instructions at home:    Eating and drinking    · Follow recommendations from your health care provider about what you eat and drink. Your health care provider may advise you to:  ? Limit fast foods, sweets, and processed snack foods.  ? Choose low-fat options, such as low-fat milk instead of whole milk.  ? Eat 5 or more servings of fruits or vegetables every day.  ? Eat at home more often. This gives you more control over  what you eat.  ? Choose healthy foods when you eat out.  ? Learn what a healthy portion size is.  ? Keep low-fat snacks on hand.  ? Avoid sugary drinks, such as soda, fruit juice, iced tea sweetened with sugar, and flavored milk.  ? Eat a healthy breakfast.  · Drink enough water to keep your urine clear or pale yellow.  · Do not go without eating for long periods of time (do not fast) or follow a fad diet. Fasting and fad diets can be unhealthy and even dangerous.  Physical Activity  · Exercise regularly, as told by your health care provider. Ask your health care provider what types of exercise are safe for you and how often you should exercise.  · Warm up and stretch before being active.  · Cool down and stretch after being active.  · Rest between periods of activity.  Lifestyle  · Limit the time that you spend in front of your TV, computer, or video game system.  · Find ways to reward yourself that do not involve food.  · Limit alcohol intake to no more than 1 drink a day for nonpregnant women and 2 drinks a day for men. One drink equals 12 oz of beer, 5 oz of wine, or 1½ oz of hard liquor.  General instructions  · Keep a weight loss journal to keep track of the food you eat and how much you exercise you get.  · Take over-the-counter and prescription medicines only as told by your health care provider.  · Take vitamins and supplements only as told by your health care provider.  · Consider joining a support group. Your health care provider may be able to recommend a support group.  · Keep all follow-up visits as told by your health care provider. This is important.  Contact a health care provider if:  · You are unable to meet your weight loss goal after 6 weeks of dietary and lifestyle changes.  This information is not intended to replace advice given to you by your health care provider. Make sure you discuss any questions you have with your health care provider.  Document Released: 01/25/2006 Document Revised:  05/22/2017 Document Reviewed: 10/05/2016  [employer] Interactive Patient Education © 2019 [employer]  MyPlate from [employer]  MyPlate is an outline of a general healthy diet based on the 2010 Dietary Guidelines for Americans, from the U.S. Department of Agriculture (USDA). It sets guidelines for how much food you should eat from each food group based on your age, sex, and level of physical activity.  What are tips for following MyPlate?  To follow MyPlate recommendations:  · Eat a wide variety of fruits and vegetables, grains, and protein foods.  · Serve smaller portions and eat less food throughout the day.  · Limit portion sizes to avoid overeating.  · Enjoy your food.  · Get at least 150 minutes of exercise every week. This is about 30 minutes each day, 5 or more days per week.    It can be difficult to have every meal look like MyPlate. Think about MyPlate as eating guidelines for an entire day, rather than each individual meal.  Fruits and Vegetables  · Make half of your plate fruits and vegetables.  · Eat many different colors of fruits and vegetables each day.  · For a 2,000 calorie daily food plan, eat:  ? 2½ cups of vegetables every day.  ? 2 cups of fruit every day.  · 1 cup is equal to:  ? 1 cup raw or cooked vegetables.  ? 1 cup raw fruit.  ? 1 medium-sized orange, apple, or banana.  ? 1 cup 100% fruit or vegetable juice.  ? 2 cups raw leafy greens, such as lettuce, spinach, or kale.  ? ½ cup dried fruit.  Grains  · One fourth of your plate should be grains.  · Make at least half of the grains you eat each day whole grains.  · For a 2,000 calorie daily food plan, eat 6 oz of grains every day.  · 1 oz is equal to:  ? 1 slice bread.  ? 1 cup cereal.  ? ½ cup cooked rice, cereal, or pasta.  Protein  · One fourth of your plate should be protein.  · Eat a wide variety of protein foods, including meat, poultry, fish, eggs, beans, nuts, and tofu.  · For a 2,000 calorie daily food plan, eat 5½ oz of protein every  day.  · 1 oz is equal to:  ? 1 oz meat, poultry, or fish.  ? ¼ cup cooked beans.  ? 1 egg.  ? ½ oz nuts or seeds.  ? 1 Tbsp peanut butter.  Dairy  · Drink fat-free or low-fat (1%) milk.  · Eat or drink dairy as a side to meals.  · For a 2,000 calorie daily food plan, eat or drink 3 cups of dairy every day.  · 1 cup is equal to:  ? 1 cup milk, yogurt, cottage cheese, or soy milk (soy beverage).  ? 2 oz processed cheese.  ? 1½ oz natural cheese.  Fats, oils, salt, and sugars  · Only small amounts of oils are recommended.  · Avoid foods that are high in calories and low in nutritional value (empty calories), like foods high in fat or added sugars.  · Choose foods that are low in salt (sodium). Choose foods that have less than 140 milligrams (mg) of sodium per serving.  · Drink water instead of sugary drinks. Drink enough water each day to keep your urine pale yellow.  Where to find support  · Work with your health care provider or a nutrition specialist (dietitian) to develop a customized eating plan that is right for you.  · Download an [employer]ane (mobile application) to help you track your daily food intake.  Where to find more information  · Go to ChooseMyPlate.gov for more information.  · Learn more and log your daily food intake according to MyPlate using USDA's SuperTracker: www.supertracker.usda.gov  Summary  · MyPlate is a general guideline for healthy eating from the USDA. It is based on the 2010 Dietary Guidelines for Americans.  · In general, fruits and vegetables should take up ½ of your plate, grains should take up ¼ of your plate, and protein should take up ¼ of your plate.  This information is not intended to replace advice given to you by your health care provider. Make sure you discuss any questions you have with your health care provider.  Document Released: 01/06/2009 Document Revised: 03/19/2018 Document Reviewed: 03/19/2018  [employer] Interactive Patient Education © 2019 [employer]

## 2019-11-27 RX ORDER — LISINOPRIL 10 MG/1
TABLET ORAL
Qty: 90 TABLET | Refills: 3 | Status: SHIPPED | OUTPATIENT
Start: 2019-11-27 | End: 2020-09-07

## 2020-04-13 RX ORDER — AMLODIPINE BESYLATE 2.5 MG/1
TABLET ORAL
Qty: 90 TABLET | Refills: 0 | Status: SHIPPED | OUTPATIENT
Start: 2020-04-13 | End: 2020-06-03

## 2020-06-03 RX ORDER — AMLODIPINE BESYLATE 2.5 MG/1
TABLET ORAL
Qty: 90 TABLET | Refills: 0 | Status: SHIPPED | OUTPATIENT
Start: 2020-06-03 | End: 2020-08-13

## 2020-06-23 ENCOUNTER — OFFICE VISIT (OUTPATIENT)
Dept: CARDIOLOGY | Facility: CLINIC | Age: 75
End: 2020-06-23

## 2020-06-23 ENCOUNTER — RESULTS ENCOUNTER (OUTPATIENT)
Dept: CARDIOLOGY | Facility: CLINIC | Age: 75
End: 2020-06-23

## 2020-06-23 VITALS
DIASTOLIC BLOOD PRESSURE: 73 MMHG | WEIGHT: 189.4 LBS | HEART RATE: 78 BPM | HEIGHT: 66 IN | OXYGEN SATURATION: 95 % | SYSTOLIC BLOOD PRESSURE: 123 MMHG | BODY MASS INDEX: 30.44 KG/M2

## 2020-06-23 DIAGNOSIS — I65.23 BILATERAL CAROTID ARTERY STENOSIS: Primary | ICD-10-CM

## 2020-06-23 DIAGNOSIS — R06.02 SHORTNESS OF BREATH: ICD-10-CM

## 2020-06-23 DIAGNOSIS — R42 DIZZINESS: ICD-10-CM

## 2020-06-23 DIAGNOSIS — Z86.73 HISTORY OF TIA (TRANSIENT ISCHEMIC ATTACK): ICD-10-CM

## 2020-06-23 DIAGNOSIS — I10 ESSENTIAL HYPERTENSION: ICD-10-CM

## 2020-06-23 DIAGNOSIS — E78.2 MIXED HYPERLIPIDEMIA: ICD-10-CM

## 2020-06-23 DIAGNOSIS — R53.82 CHRONIC FATIGUE: ICD-10-CM

## 2020-06-23 DIAGNOSIS — Z82.49 FAMILY HISTORY OF CORONARY ARTERY DISEASE: ICD-10-CM

## 2020-06-23 DIAGNOSIS — R55 SYNCOPE, UNSPECIFIED SYNCOPE TYPE: ICD-10-CM

## 2020-06-23 DIAGNOSIS — R01.1 SYSTOLIC EJECTION MURMUR: ICD-10-CM

## 2020-06-23 PROCEDURE — 93000 ELECTROCARDIOGRAM COMPLETE: CPT | Performed by: INTERNAL MEDICINE

## 2020-06-23 PROCEDURE — 99214 OFFICE O/P EST MOD 30 MIN: CPT | Performed by: INTERNAL MEDICINE

## 2020-06-23 NOTE — PROGRESS NOTES
Subjective   Deborah Miguel is a 74 y.o. female     Chief Complaint   Patient presents with   • Hypertension     Here for yearly f/u   • Hyperlipidemia   • Loss of Consciousness   • Transient Ischemic Attack       PROBLEM LIST:     1. Fatigue   2. Hyperlipidemia   3. Hypertension   4. Thyroid disease   5. H/o TIA  6. Diabetes   7. Family H/o CAD   8. Carotid artery stenosis   8.1 carotid duplex 05/02/17 Potentially hemodynamically significant disease in the proximal right internal carotid artery. Although stenosis appears more the range of 50% by echo and Doppler sampling, Doppler velocities are compatible with stenosis in the range described above. Consider CTA for further Evaluation.  8.2 CTA neck 06/02/17 stenosis HECTOR approximal  50%, stenosis LICA less than 40%. No flow identified in right vertebral artery, left vertebral artery is patent and extends to fill the basilar artery.        Specialty Problems        Cardiology Problems    Carotid artery stenosis        Essential hypertension        Mixed hyperlipidemia        Syncope        Systolic ejection murmur                HPI:  Ms. new returns for follow-up on the above.    She has done well over the last year.  Her exercise has decreased over the past several months because of COVID-19 but otherwise she feels she has had no change in her functional capacity.  She continues to be without chest pain, orthopnea, or PND.  Mild lower extremity edema has been stable.  Ms. miguel continues to have episodes of vertigo-like symptoms as well as very mild orthostatic lightheadedness but both of these symptoms are improved in comparison to what she felt a year ago.  The patient does not palpitate.    Ms. miguel denies claudication and specifically denies any symptoms of TIA or stroke.  She was found to have approximately 50% right internal carotid artery stenosis by CTA.  Blood pressures appear well controlled over time, we have no recent fasting lipid profile  data.                        PRIOR MEDICATIONS    Current Outpatient Medications on File Prior to Visit   Medication Sig Dispense Refill   • amLODIPine (NORVASC) 2.5 MG tablet TAKE 1 TABLET EVERY DAY 90 tablet 0   • aspirin 325 MG tablet Take 325 mg by mouth Daily.     • bimatoprost (LUMIGAN) 0.01 % ophthalmic drops 1 drop Every Night.     • brimonidine (ALPHAGAN) 0.15 % ophthalmic solution 1 drop 3 (Three) Times a Day.     • cholecalciferol (VITAMIN D3) 1000 UNITS tablet Take 1,000 Units by mouth Daily.     • glimepiride (AMARYL) 4 MG tablet Daily.     • levothyroxine (SYNTHROID, LEVOTHROID) 88 MCG tablet Take 88 mcg by mouth Daily.     • lisinopril (PRINIVIL,ZESTRIL) 10 MG tablet TAKE 1 TABLET EVERY DAY 90 tablet 3   • omeprazole (priLOSEC) 40 MG capsule Take 40 mg by mouth Daily.     • pravastatin (PRAVACHOL) 20 MG tablet Take 20 mg by mouth Daily.       No current facility-administered medications on file prior to visit.        ALLERGIES:    Patient has no known allergies.    PAST MEDICAL HISTORY:    Past Medical History:   Diagnosis Date   • Cancer (CMS/HCC)    • Diabetes mellitus (CMS/HCC)    • GERD (gastroesophageal reflux disease)    • Hyperlipidemia    • Hypertension    • Hypothyroidism    • Non Hodgkin's lymphoma (CMS/HCC)    • Non Hodgkin's lymphoma (CMS/HCC)    • Rupture of bowel (CMS/HCC)     2014    • TIA (transient ischemic attack)        SURGICAL HISTORY:    Past Surgical History:   Procedure Laterality Date   • CATARACT EXTRACTION     • CHOLECYSTECTOMY OPEN     • EYE SURGERY     • TUMOR REMOVAL      under jaw       SOCIAL HISTORY:    Social History     Socioeconomic History   • Marital status: Single     Spouse name: Not on file   • Number of children: Not on file   • Years of education: Not on file   • Highest education level: Not on file   Tobacco Use   • Smoking status: Never Smoker   • Smokeless tobacco: Never Used   Substance and Sexual Activity   • Alcohol use: No   • Drug use: No   • Sexual  "activity: Defer       FAMILY HISTORY:    Family History   Problem Relation Age of Onset   • Heart disease Mother    • COPD Mother    • Pancreatic cancer Father    • Heart disease Maternal Uncle        Review of Systems   Constitutional: Negative.    HENT: Negative.    Eyes: Positive for visual disturbance (glasses prn).   Respiratory: Positive for shortness of breath.    Cardiovascular: Positive for leg swelling (occas.). Negative for chest pain and palpitations.   Gastrointestinal: Positive for vomiting.   Endocrine: Negative.    Genitourinary: Negative.    Musculoskeletal: Positive for arthralgias and myalgias.        Denies leg cramps with ambulation, but at night   Skin: Negative.    Allergic/Immunologic: Positive for environmental allergies.   Neurological: Positive for dizziness and numbness (rt. arm).   Hematological: Negative.    Psychiatric/Behavioral: Negative.        VISIT VITALS:  Vitals:    06/23/20 0916   BP: 123/73   BP Location: Left arm   Patient Position: Sitting   Pulse: 78   SpO2: 95%   Weight: 85.9 kg (189 lb 6.4 oz)   Height: 167.6 cm (66\")      /73 (BP Location: Left arm, Patient Position: Sitting)   Pulse 78   Ht 167.6 cm (66\")   Wt 85.9 kg (189 lb 6.4 oz)   SpO2 95%   BMI 30.57 kg/m²     RECENT LABS:    Objective       Physical Exam   Constitutional: She is oriented to person, place, and time. She appears well-developed and well-nourished. No distress.   HENT:   Head: Normocephalic and atraumatic.   Eyes: Pupils are equal, round, and reactive to light. Conjunctivae and EOM are normal.   Neck: Normal range of motion. Neck supple. No hepatojugular reflux and no JVD present. Carotid bruit is not present. No tracheal deviation present.   Nl. Carotid upstrokes   Cardiovascular: Normal rate, regular rhythm, S1 normal, S2 normal and intact distal pulses.  Occasional extrasystoles are present. Exam reveals gallop and S4. Exam reveals no S3.   Murmur heard.   Systolic murmur is " present.  Pulses:       Radial pulses are 2+ on the right side, and 2+ on the left side.   1/6 systolic ejection murmur RUSB  No MR  No AI   Pulmonary/Chest: Effort normal and breath sounds normal. She has no wheezes. She has no rhonchi. She has no rales.   Nl. Expir. Phase  Nl. Breath sound intensity   Abdominal: Soft. Bowel sounds are normal. She exhibits no distension, no abdominal bruit and no mass. There is no tenderness. There is no rebound and no guarding.   No organomegaly   Musculoskeletal: Normal range of motion. She exhibits edema. She exhibits no tenderness or deformity.   LLE, trace-1+ edema, palpable pedal pulses  RLE, trace edema, palpable pedal pulses   Neurological: She is alert and oriented to person, place, and time.   Skin: Skin is warm and dry. No rash noted. No erythema. No pallor.   Psychiatric: She has a normal mood and affect. Her behavior is normal. Judgment and thought content normal.   Nursing note and vitals reviewed.        ECG 12 Lead  Date/Time: 6/23/2020 9:54 AM  Performed by: Jameson Franks MD  Authorized by: Jameson Franks MD   Comparison: compared with previous ECG from 6/20/2019  Similar to previous ECG  Comments: Sinus at 66.  Left anterior fascicular block.  No change from prior.              Assessment/Plan   #1.  Peripheral arterial disease.  The patient is asymptomatic of peripheral arterial disease or arterial embolic events.  Medications are appropriate.  No changes are recommended at this time.  We will check fasting lipid profile liver enzymes through Dr. Nye office.    2.  Controlled systemic hypertension.  Blood pressures are well controlled and are followed through Dr. Nye office.    3.  Treated dyslipidemia.  See #1 above.    4.  History of presyncope and syncope in the very distant past with only occasional episodes of lightheadedness and vertigo.  Ms. miguel uses head movements to arrest her vertiginous symptoms, and she has minimal orthostasis  currently.  Both types of symptoms have improved over the last year.  We will continue current measures.  Of note, the patient had an extensive prior evaluation while she lived in [place] with event monitoring, stress testing, and tilt table testing.  All were unremarkable.    5.  We reviewed today symptoms which might represent cerebral or coronary ischemia, heart failure, significant dysrhythmia, or other symptoms of peripheral arterial disease.  The patient will report any of these immediately.  Otherwise she will follow-up with Dr. Nye as instructed and in our office in 1 year.   Diagnosis Plan   1. Bilateral carotid artery stenosis     2. Essential hypertension     3. Mixed hyperlipidemia     4. Syncope, unspecified syncope type     5. Systolic ejection murmur     6. Shortness of breath     7. Chronic fatigue     8. Dizziness     9. Family history of coronary artery disease     10. History of TIA (transient ischemic attack)         No follow-ups on file.         Deborah Corrales  reports that she has never smoked. She has never used smokeless tobacco.. I have educated her on the risk of diseases from using tobacco products such as cancer, COPD and heart diease.               Patient's Body mass index is 30.57 kg/m². BMI is above normal parameters. Recommendations include: educational material and referral to primary care.       Debbie Fabian LPN    Scribed for Dr. Jameson Franks by Debbie Fabian LPN June 23, 2020 09:25         Electronically signed by:            This note is dictated utilizing voice recognition software.  Although this record has been proof read, transcriptional errors may still be present. If questions occur regarding the content of this record please do not hesitate to call our office.

## 2020-06-23 NOTE — PATIENT INSTRUCTIONS
Obesity, Adult  Obesity is the condition of having too much total body fat. Being overweight or obese means that your weight is greater than what is considered healthy for your body size. Obesity is determined by a measurement called BMI. BMI is an estimate of body fat and is calculated from height and weight. For adults, a BMI of 30 or higher is considered obese.  Obesity can lead to other health concerns and major illnesses, including:  · Stroke.  · Coronary artery disease (CAD).  · Type 2 diabetes.  · Some types of cancer, including cancers of the colon, breast, uterus, and gallbladder.  · Osteoarthritis.  · High blood pressure (hypertension).  · High cholesterol.  · Sleep apnea.  · Gallbladder stones.  · Infertility problems.  What are the causes?  Common causes of this condition include:  · Eating daily meals that are high in calories, sugar, and fat.  · Being born with genes that may make you more likely to become obese.  · Having a medical condition that causes obesity, including:  ? Hypothyroidism.  ? Polycystic ovarian syndrome (PCOS).  ? Binge-eating disorder.  ? Cushing syndrome.  · Taking certain medicines, such as steroids, antidepressants, and seizure medicines.  · Not being physically active (sedentary lifestyle).  · Not getting enough sleep.  · Drinking high amounts of sugar-sweetened beverages, such as soft drinks.  What increases the risk?  The following factors may make you more likely to develop this condition:  · Having a family history of obesity.  · Being a woman of  descent.  · Being a man of  descent.  · Living in an area with limited access to:  ? [place], recreation centers, or sidewalks.  ? Healthy food choices, such as grocery stores and farmers' markets.  What are the signs or symptoms?  The main sign of this condition is having too much body fat.  How is this diagnosed?  This condition is diagnosed based on:  · Your BMI. If you are an adult with a BMI of 30 or  higher, you are considered obese.  · Your waist circumference. This measures the distance around your waistline.  · Your skinfold thickness. Your health care provider may gently pinch a fold of your skin and measure it.  You may have other tests to check for underlying conditions.  How is this treated?  Treatment for this condition often includes changing your lifestyle. Treatment may include some or all of the following:  · Dietary changes. This may include developing a healthy meal plan.  · Regular physical activity. This may include activity that causes your heart to beat faster (aerobic exercise) and strength training. Work with your health care provider to design an exercise program that works for you.  · Medicine to help you lose weight if you are unable to lose 1 pound a week after 6 weeks of healthy eating and more physical activity.  · Treating conditions that cause the obesity (underlying conditions).  · Surgery. Surgical options may include gastric banding and gastric bypass. Surgery may be done if:  ? Other treatments have not helped to improve your condition.  ? You have a BMI of 40 or higher.  ? You have life-threatening health problems related to obesity.  Follow these instructions at home:  Eating and drinking    · Follow recommendations from your health care provider about what you eat and drink. Your health care provider may advise you to:  ? Limit fast food, sweets, and processed snack foods.  ? Choose low-fat options, such as low-fat milk instead of whole milk.  ? Eat 5 or more servings of fruits or vegetables every day.  ? Eat at home more often. This gives you more control over what you eat.  ? Choose healthy foods when you eat out.  ? Learn to read food labels. This will help you understand how much food is considered 1 serving.  ? Learn what a healthy serving size is.  ? Keep low-fat snacks available.  ? Limit sugary drinks, such as soda, fruit juice, sweetened iced tea, and flavored  milk.  · Drink enough water to keep your urine pale yellow.  · Do not follow a fad diet. Fad diets can be unhealthy and even dangerous.  Physical activity  · Exercise regularly, as told by your health care provider.  ? Most adults should get up to 150 minutes of moderate-intensity exercise every week.  ? Ask your health care provider what types of exercise are safe for you and how often you should exercise.  · Warm up and stretch before being active.  · Cool down and stretch after being active.  · Rest between periods of activity.  Lifestyle  · Work with your health care provider and a dietitian to set a weight-loss goal that is healthy and reasonable for you.  · Limit your screen time.  · Find ways to reward yourself that do not involve food.  · Do not drink alcohol if:  ? Your health care provider tells you not to drink.  ? You are pregnant, may be pregnant, or are planning to become pregnant.  · If you drink alcohol:  ? Limit how much you use to:  § 0-1 drink a day for women.  § 0-2 drinks a day for men.  ? Be aware of how much alcohol is in your drink. In the [place] one drink equals one 12 oz bottle of beer (355 mL), one 5 oz glass of wine (148 mL), or one 1½ oz glass of hard liquor (44 mL).  General instructions  · Keep a weight-loss journal to keep track of the food you eat and how much exercise you get.  · Take over-the-counter and prescription medicines only as told by your health care provider.  · Take vitamins and supplements only as told by your health care provider.  · Consider joining a support group. Your health care provider may be able to recommend a support group.  · Keep all follow-up visits as told by your health care provider. This is important.  Contact a health care provider if:  · You are unable to meet your weight loss goal after 6 weeks of dietary and lifestyle changes.  Get help right away if you are having:  · Trouble breathing.  · Suicidal thoughts or behaviors.  Summary  · Obesity is the  condition of having too much total body fat.  · Being overweight or obese means that your weight is greater than what is considered healthy for your body size.  · Work with your health care provider and a dietitian to set a weight-loss goal that is healthy and reasonable for you.  · Exercise regularly, as told by your health care provider. Ask your health care provider what types of exercise are safe for you and how often you should exercise.  This information is not intended to replace advice given to you by your health care provider. Make sure you discuss any questions you have with your health care provider.  Document Released: 01/25/2006 Document Revised: 08/22/2019 Document Reviewed: 08/22/2019  [employer] Patient Education © 2020 [employer]  MyPlate from [employer]    MyPlate is an outline of a general healthy diet based on the 2010 Dietary Guidelines for Americans, from the [place] Department of Agriculture (USDA). It sets guidelines for how much food you should eat from each food group based on your age, sex, and level of physical activity.  What are tips for following MyPlate?  To follow MyPlate recommendations:  · Eat a wide variety of fruits and vegetables, grains, and protein foods.  · Serve smaller portions and eat less food throughout the day.  · Limit portion sizes to avoid overeating.  · Enjoy your food.  · Get at least 150 minutes of exercise every week. This is about 30 minutes each day, 5 or more days per week.  It can be difficult to have every meal look like MyPlate. Think about MyPlate as eating guidelines for an entire day, rather than each individual meal.  Fruits and vegetables  · Make half of your plate fruits and vegetables.  · Eat many different colors of fruits and vegetables each day.  · For a 2,000 calorie daily food plan, eat:  ? 2½ cups of vegetables every day.  ? 2 cups of fruit every day.  · 1 cup is equal to:  ? 1 cup raw or cooked vegetables.  ? 1 cup raw fruit.  ? 1 medium-sized orange,  apple, or banana.  ? 1 cup 100% fruit or vegetable juice.  ? 2 cups raw leafy greens, such as lettuce, spinach, or kale.  ? ½ cup dried fruit.  Grains  · One fourth of your plate should be grains.  · Make at least half of the grains you eat each day whole grains.  · For a 2,000 calorie daily food plan, eat 6 oz of grains every day.  · 1 oz is equal to:  ? 1 slice bread.  ? 1 cup cereal.  ? ½ cup cooked rice, cereal, or pasta.  Protein  · One fourth of your plate should be protein.  · Eat a wide variety of protein foods, including meat, poultry, fish, eggs, beans, nuts, and tofu.  · For a 2,000 calorie daily food plan, eat 5½ oz of protein every day.  · 1 oz is equal to:  ? 1 oz meat, poultry, or fish.  ? ¼ cup cooked beans.  ? 1 egg.  ? ½ oz nuts or seeds.  ? 1 Tbsp peanut butter.  Dairy  · Drink fat-free or low-fat (1%) milk.  · Eat or drink dairy as a side to meals.  · For a 2,000 calorie daily food plan, eat or drink 3 cups of dairy every day.  · 1 cup is equal to:  ? 1 cup milk, yogurt, cottage cheese, or soy milk (soy beverage).  ? 2 oz processed cheese.  ? 1½ oz natural cheese.  Fats, oils, salt, and sugars  · Only small amounts of oils are recommended.  · Avoid foods that are high in calories and low in nutritional value (empty calories), like foods high in fat or added sugars.  · Choose foods that are low in salt (sodium). Choose foods that have less than 140 milligrams (mg) of sodium per serving.  · Drink water instead of sugary drinks. Drink enough water each day to keep your urine pale yellow.  Where to find support  · Work with your health care provider or a nutrition specialist (dietitian) to develop a customized eating plan that is right for you.  · Download an kane (mobile application) to help you track your daily food intake.  Where to find more information  · Go to ChooseMyPlate.gov for more information.  · Learn more and log your daily food intake according to MyPlate using USDA's SuperTracker:  www.supertracker.usda.gov  Summary  · MyPlate is a general guideline for healthy eating from the USDA. It is based on the 2010 Dietary Guidelines for Americans.  · In general, fruits and vegetables should take up ½ of your plate, grains should take up ¼ of your plate, and protein should take up ¼ of your plate.  This information is not intended to replace advice given to you by your health care provider. Make sure you discuss any questions you have with your health care provider.  Document Released: 01/06/2009 Document Revised: 01/19/2019 Document Reviewed: 03/19/2018  [employer] Patient Education © 2020 [employer]

## 2020-08-13 RX ORDER — AMLODIPINE BESYLATE 2.5 MG/1
TABLET ORAL
Qty: 90 TABLET | Refills: 0 | Status: SHIPPED | OUTPATIENT
Start: 2020-08-13 | End: 2020-11-02

## 2020-09-07 RX ORDER — LISINOPRIL 10 MG/1
TABLET ORAL
Qty: 90 TABLET | Refills: 3 | Status: SHIPPED | OUTPATIENT
Start: 2020-09-07 | End: 2021-08-31

## 2020-11-02 RX ORDER — AMLODIPINE BESYLATE 2.5 MG/1
TABLET ORAL
Qty: 90 TABLET | Refills: 3 | Status: SHIPPED | OUTPATIENT
Start: 2020-11-02 | End: 2021-12-10

## 2021-08-31 ENCOUNTER — OFFICE VISIT (OUTPATIENT)
Dept: CARDIOLOGY | Facility: CLINIC | Age: 76
End: 2021-08-31

## 2021-08-31 VITALS
HEIGHT: 66 IN | SYSTOLIC BLOOD PRESSURE: 158 MMHG | HEART RATE: 70 BPM | DIASTOLIC BLOOD PRESSURE: 74 MMHG | WEIGHT: 188 LBS | BODY MASS INDEX: 30.22 KG/M2 | OXYGEN SATURATION: 97 %

## 2021-08-31 DIAGNOSIS — R53.82 CHRONIC FATIGUE: ICD-10-CM

## 2021-08-31 DIAGNOSIS — R01.1 SYSTOLIC EJECTION MURMUR: Primary | ICD-10-CM

## 2021-08-31 DIAGNOSIS — I65.23 BILATERAL CAROTID ARTERY STENOSIS: ICD-10-CM

## 2021-08-31 DIAGNOSIS — I10 ESSENTIAL HYPERTENSION: ICD-10-CM

## 2021-08-31 DIAGNOSIS — Z86.73 HISTORY OF TIA (TRANSIENT ISCHEMIC ATTACK): ICD-10-CM

## 2021-08-31 DIAGNOSIS — R06.02 SHORTNESS OF BREATH: ICD-10-CM

## 2021-08-31 DIAGNOSIS — R68.89 ANERGY: ICD-10-CM

## 2021-08-31 DIAGNOSIS — E78.2 MIXED HYPERLIPIDEMIA: ICD-10-CM

## 2021-08-31 DIAGNOSIS — R55 SYNCOPE, UNSPECIFIED SYNCOPE TYPE: ICD-10-CM

## 2021-08-31 DIAGNOSIS — R42 DIZZINESS: ICD-10-CM

## 2021-08-31 PROCEDURE — 93000 ELECTROCARDIOGRAM COMPLETE: CPT | Performed by: INTERNAL MEDICINE

## 2021-08-31 PROCEDURE — 99213 OFFICE O/P EST LOW 20 MIN: CPT | Performed by: INTERNAL MEDICINE

## 2021-08-31 RX ORDER — DORZOLAMIDE HYDROCHLORIDE AND TIMOLOL MALEATE 20; 5 MG/ML; MG/ML
1 SOLUTION/ DROPS OPHTHALMIC 2 TIMES DAILY
COMMUNITY
Start: 2021-05-26

## 2021-08-31 RX ORDER — LISINOPRIL 10 MG/1
TABLET ORAL
Qty: 90 TABLET | Refills: 3 | Status: SHIPPED | OUTPATIENT
Start: 2021-08-31 | End: 2022-06-13

## 2021-08-31 NOTE — PATIENT INSTRUCTIONS
Obesity, Adult  Obesity is the condition of having too much total body fat. Being overweight or obese means that your weight is greater than what is considered healthy for your body size. Obesity is determined by a measurement called BMI. BMI is an estimate of body fat and is calculated from height and weight. For adults, a BMI of 30 or higher is considered obese.  Obesity can lead to other health concerns and major illnesses, including:  · Stroke.  · Coronary artery disease (CAD).  · Type 2 diabetes.  · Some types of cancer, including cancers of the colon, breast, uterus, and gallbladder.  · Osteoarthritis.  · High blood pressure (hypertension).  · High cholesterol.  · Sleep apnea.  · Gallbladder stones.  · Infertility problems.  What are the causes?  Common causes of this condition include:  · Eating daily meals that are high in calories, sugar, and fat.  · Being born with genes that may make you more likely to become obese.  · Having a medical condition that causes obesity, including:  ? Hypothyroidism.  ? Polycystic ovarian syndrome (PCOS).  ? Binge-eating disorder.  ? Cushing syndrome.  · Taking certain medicines, such as steroids, antidepressants, and seizure medicines.  · Not being physically active (sedentary lifestyle).  · Not getting enough sleep.  · Drinking high amounts of sugar-sweetened beverages, such as soft drinks.  What increases the risk?  The following factors may make you more likely to develop this condition:  · Having a family history of obesity.  · Being a woman of  descent.  · Being a man of  descent.  · Living in an area with limited access to:  ? Tam, recreation centers, or sidewalks.  ? Healthy food choices, such as grocery stores and farmers' markets.  What are the signs or symptoms?  The main sign of this condition is having too much body fat.  How is this diagnosed?  This condition is diagnosed based on:  · Your BMI. If you are an adult with a BMI of 30 or  higher, you are considered obese.  · Your waist circumference. This measures the distance around your waistline.  · Your skinfold thickness. Your health care provider may gently pinch a fold of your skin and measure it.  You may have other tests to check for underlying conditions.  How is this treated?  Treatment for this condition often includes changing your lifestyle. Treatment may include some or all of the following:  · Dietary changes. This may include developing a healthy meal plan.  · Regular physical activity. This may include activity that causes your heart to beat faster (aerobic exercise) and strength training. Work with your health care provider to design an exercise program that works for you.  · Medicine to help you lose weight if you are unable to lose 1 pound a week after 6 weeks of healthy eating and more physical activity.  · Treating conditions that cause the obesity (underlying conditions).  · Surgery. Surgical options may include gastric banding and gastric bypass. Surgery may be done if:  ? Other treatments have not helped to improve your condition.  ? You have a BMI of 40 or higher.  ? You have life-threatening health problems related to obesity.  Follow these instructions at home:  Eating and drinking    · Follow recommendations from your health care provider about what you eat and drink. Your health care provider may advise you to:  ? Limit fast food, sweets, and processed snack foods.  ? Choose low-fat options, such as low-fat milk instead of whole milk.  ? Eat 5 or more servings of fruits or vegetables every day.  ? Eat at home more often. This gives you more control over what you eat.  ? Choose healthy foods when you eat out.  ? Learn to read food labels. This will help you understand how much food is considered 1 serving.  ? Learn what a healthy serving size is.  ? Keep low-fat snacks available.  ? Limit sugary drinks, such as soda, fruit juice, sweetened iced tea, and flavored  milk.  · Drink enough water to keep your urine pale yellow.  · Do not follow a fad diet. Fad diets can be unhealthy and even dangerous.  Physical activity  · Exercise regularly, as told by your health care provider.  ? Most adults should get up to 150 minutes of moderate-intensity exercise every week.  ? Ask your health care provider what types of exercise are safe for you and how often you should exercise.  · Warm up and stretch before being active.  · Cool down and stretch after being active.  · Rest between periods of activity.  Lifestyle  · Work with your health care provider and a dietitian to set a weight-loss goal that is healthy and reasonable for you.  · Limit your screen time.  · Find ways to reward yourself that do not involve food.  · Do not drink alcohol if:  ? Your health care provider tells you not to drink.  ? You are pregnant, may be pregnant, or are planning to become pregnant.  · If you drink alcohol:  ? Limit how much you use to:  § 0-1 drink a day for women.  § 0-2 drinks a day for men.  ? Be aware of how much alcohol is in your drink. In the [place] one drink equals one 12 oz bottle of beer (355 mL), one 5 oz glass of wine (148 mL), or one 1½ oz glass of hard liquor (44 mL).  General instructions  · Keep a weight-loss journal to keep track of the food you eat and how much exercise you get.  · Take over-the-counter and prescription medicines only as told by your health care provider.  · Take vitamins and supplements only as told by your health care provider.  · Consider joining a support group. Your health care provider may be able to recommend a support group.  · Keep all follow-up visits as told by your health care provider. This is important.  Contact a health care provider if:  · You are unable to meet your weight loss goal after 6 weeks of dietary and lifestyle changes.  Get help right away if you are having:  · Trouble breathing.  · Suicidal thoughts or behaviors.  Summary  · Obesity is the  condition of having too much total body fat.  · Being overweight or obese means that your weight is greater than what is considered healthy for your body size.  · Work with your health care provider and a dietitian to set a weight-loss goal that is healthy and reasonable for you.  · Exercise regularly, as told by your health care provider. Ask your health care provider what types of exercise are safe for you and how often you should exercise.  This information is not intended to replace advice given to you by your health care provider. Make sure you discuss any questions you have with your health care provider.  Document Revised: 08/22/2019 Document Reviewed: 08/22/2019  [employer] Patient Education © 2021 [employer]  BMI for Adults  What is BMI?  Body mass index (BMI) is a number that is calculated from a person's weight and height. BMI can help estimate how much of a person's weight is composed of fat. BMI does not measure body fat directly. Rather, it is an alternative to procedures that directly measure body fat, which can be difficult and expensive.  BMI can help identify people who may be at higher risk for certain medical problems.  What are BMI measurements used for?  BMI is used as a screening tool to identify possible weight problems. It helps determine whether a person is obese, overweight, a healthy weight, or underweight.  BMI is useful for:  · Identifying a weight problem that may be related to a medical condition or may increase the risk for medical problems.  · Promoting changes, such as changes in diet and exercise, to help reach a healthy weight. BMI screening can be repeated to see if these changes are working.  How is BMI calculated?  BMI involves measuring your weight in relation to your height. Both height and weight are measured, and the BMI is calculated from those numbers. This can be done either in English ([place] or metric measurements. Note that charts and online BMI calculators are  "available to help you find your BMI quickly and easily without having to do these calculations yourself.  To calculate your BMI in English ([place] measurements:    1. Measure your weight in pounds (lb).  2. Multiply the number of pounds by 703.  ? For example, for a person who weighs 180 lb, multiply that number by 703, which equals 126,540.  3. Measure your height in inches. Then multiply that number by itself to get a measurement called \"inches squared.\"  ? For example, for a person who is 70 inches tall, the \"inches squared\" measurement is 70 inches x 70 inches, which equals 4,900 inches squared.  4. Divide the total from step 2 (number of lb x 703) by the total from step 3 (inches squared): 126,540 ÷ 4,900 = 25.8. This is your BMI.  To calculate your BMI in metric measurements:  1. Measure your weight in kilograms (kg).  2. Measure your height in meters (m). Then multiply that number by itself to get a measurement called \"meters squared.\"  ? For example, for a person who is 1.75 m tall, the \"meters squared\" measurement is 1.75 m x 1.75 m, which is equal to 3.1 meters squared.  3. Divide the number of kilograms (your weight) by the meters squared number. In this example: 70 ÷ 3.1 = 22.6. This is your BMI.  What do the results mean?  BMI charts are used to identify whether you are underweight, normal weight, overweight, or obese. The following guidelines will be used:  · Underweight: BMI less than 18.5.  · Normal weight: BMI between 18.5 and 24.9.  · Overweight: BMI between 25 and 29.9.  · Obese: BMI of 30 or above.  Keep these notes in mind:  · Weight includes both fat and muscle, so someone with a muscular build, such as an athlete, may have a BMI that is higher than 24.9. In cases like these, BMI is not an accurate measure of body fat.  · To determine if excess body fat is the cause of a BMI of 25 or higher, further assessments may need to be done by a health care provider.  · BMI is usually interpreted in the " same way for men and women.  Where to find more information  For more information about BMI, including tools to quickly calculate your BMI, go to these websites:  · Centers for Disease Control and Prevention: www.cdc.gov  · American Heart Association: www.heart.org  · National Heart, Lung, and Blood [place]: www.nhlbi.nih.gov  Summary  · Body mass index (BMI) is a number that is calculated from a person's weight and height.  · BMI may help estimate how much of a person's weight is composed of fat. BMI can help identify those who may be at higher risk for certain medical problems.  · BMI can be measured using English measurements or metric measurements.  · BMI charts are used to identify whether you are underweight, normal weight, overweight, or obese.  This information is not intended to replace advice given to you by your health care provider. Make sure you discuss any questions you have with your health care provider.  Document Revised: 09/09/2020 Document Reviewed: 07/17/2020  [employer] Patient Education © 2021 [employer]

## 2021-08-31 NOTE — PROGRESS NOTES
Subjective   Deborah Corrales is a 76 y.o. female     Chief Complaint   Patient presents with   • Follow-up     Here for yearly f/u   • Hyperlipidemia   • Hypertension   • Carotid Artery Disease   • Transient Ischemic Attack   • Loss of Consciousness       PROBLEM LIST:     1. Fatigue   2. Hyperlipidemia   3. Hypertension   4. Thyroid disease   5. H/o TIA  6. Diabetes   7. Family H/o CAD   8. Carotid artery stenosis   8.1 carotid duplex 05/02/17 Potentially hemodynamically significant disease in the proximal right internal carotid artery. Although stenosis appears more the range of 50% by echo and Doppler sampling, Doppler velocities are compatible with stenosis in the range described above. Consider CTA for further Evaluation.  8.2 CTA neck 06/02/17 stenosis HECTOR approximal  50%, stenosis LICA less than 40%. No flow identified in right vertebral artery, left vertebral artery is patent and extends to fill the basilar artery.      Specialty Problems        Cardiology Problems    Carotid artery stenosis        Essential hypertension        Mixed hyperlipidemia        Syncope        Systolic ejection murmur                HPI:  Ms. Lantigua returns for follow-up on the above.    She has done well over the last year.  She denies any type of chest discomfort.  She also denies orthopnea, PND, or lower extremity edema.  She has no palpitations, presyncope, or syncope.  Ms. corrales continues to have episodes of mild dizziness as well as mild orthostasis unchanged from prior.  She does relate that exertional dyspnea is somewhat more prominent than it was a year ago.    The patient describes no symptoms of TIA or stroke.  She does not check blood pressures regularly but, by report, blood pressures have been well controlled.  We have no recent fasting lipid profile data.    In reviewing data from Dr. Nye office it was noted the patient had a white count of 17,000 with elevation of multiple cell lines.  When I asked the patient if  she had discussed this with Dr. Nye she seemed unaware.  However, she later stated that she was following with Dr. Silva.  I suspect the patient may have a chronic myeloproliferative disorder such as CML or CMML.  In any event the patient states that Dr. Silva told her that her findings did not warrant specific treatment.                        PRIOR MEDICATIONS    Current Outpatient Medications on File Prior to Visit   Medication Sig Dispense Refill   • amLODIPine (NORVASC) 2.5 MG tablet TAKE 1 TABLET EVERY DAY 90 tablet 3   • aspirin 325 MG tablet Take 325 mg by mouth Daily.     • bimatoprost (LUMIGAN) 0.01 % ophthalmic drops 1 drop Every Night.     • cholecalciferol (VITAMIN D3) 1000 UNITS tablet Take 1,000 Units by mouth Daily.     • glimepiride (AMARYL) 4 MG tablet Daily.     • levothyroxine (SYNTHROID, LEVOTHROID) 88 MCG tablet Take 88 mcg by mouth Daily.     • lisinopril (PRINIVIL,ZESTRIL) 10 MG tablet TAKE 1 TABLET EVERY DAY 90 tablet 3   • omeprazole (priLOSEC) 40 MG capsule Take 40 mg by mouth Daily.     • pravastatin (PRAVACHOL) 20 MG tablet Take 20 mg by mouth Daily.     • dorzolamide-timolol (COSOPT) 22.3-6.8 MG/ML ophthalmic solution Administer 1 drop to the right eye 2 (two) times a day.     • [DISCONTINUED] bimatoprost (LUMIGAN) 0.01 % ophthalmic drops 1 drop Every Night.     • [DISCONTINUED] brimonidine (ALPHAGAN) 0.15 % ophthalmic solution 1 drop 3 (Three) Times a Day.     • [DISCONTINUED] lisinopril (PRINIVIL,ZESTRIL) 10 MG tablet TAKE 1 TABLET EVERY DAY 90 tablet 3     No current facility-administered medications on file prior to visit.       ALLERGIES:    Patient has no known allergies.    PAST MEDICAL HISTORY:    Past Medical History:   Diagnosis Date   • Cancer (CMS/HCC)    • Diabetes mellitus (CMS/HCC)    • GERD (gastroesophageal reflux disease)    • Hyperlipidemia    • Hypertension    • Hypothyroidism    • Non Hodgkin's lymphoma (CMS/HCC)    • Non Hodgkin's lymphoma (CMS/HCC)    •  "Rupture of bowel (CMS/HCC)     2014    • TIA (transient ischemic attack)        SURGICAL HISTORY:    Past Surgical History:   Procedure Laterality Date   • CATARACT EXTRACTION     • CHOLECYSTECTOMY OPEN     • EYE SURGERY     • TUMOR REMOVAL      under jaw       SOCIAL HISTORY:    Social History     Socioeconomic History   • Marital status: Single     Spouse name: Not on file   • Number of children: Not on file   • Years of education: Not on file   • Highest education level: Not on file   Tobacco Use   • Smoking status: Never Smoker   • Smokeless tobacco: Never Used   Substance and Sexual Activity   • Alcohol use: No   • Drug use: No   • Sexual activity: Defer       FAMILY HISTORY:    Family History   Problem Relation Age of Onset   • Heart disease Mother    • COPD Mother    • Pancreatic cancer Father    • Heart disease Maternal Uncle        Review of Systems   Constitutional: Positive for fatigue.   HENT: Negative.    Eyes: Positive for visual disturbance (glasses prn).   Respiratory: Positive for shortness of breath (occas. ).    Cardiovascular: Negative.    Gastrointestinal: Negative.    Endocrine: Negative.    Genitourinary: Negative.    Musculoskeletal: Positive for arthralgias, myalgias and neck pain.   Skin: Negative.    Allergic/Immunologic: Positive for environmental allergies.   Neurological: Positive for dizziness. Negative for syncope.   Hematological: Negative.    Psychiatric/Behavioral: Negative.        VISIT VITALS:  Vitals:    08/31/21 1508   BP: 158/74   BP Location: Left arm   Patient Position: Sitting   Pulse: 70   SpO2: 97%   Weight: 85.3 kg (188 lb)   Height: 167.6 cm (66\")      /74 (BP Location: Left arm, Patient Position: Sitting)   Pulse 70   Ht 167.6 cm (66\")   Wt 85.3 kg (188 lb)   SpO2 97%   BMI 30.34 kg/m²     RECENT LABS:    Objective       Physical Exam  Vitals and nursing note reviewed.   Constitutional:       General: She is not in acute distress.     Appearance: She is " well-developed.   HENT:      Head: Normocephalic and atraumatic.   Eyes:      Conjunctiva/sclera: Conjunctivae normal.      Pupils: Pupils are equal, round, and reactive to light.   Neck:      Vascular: No carotid bruit, hepatojugular reflux or JVD.      Trachea: No tracheal deviation.      Comments: Nl. Carotid upstrokes  Cardiovascular:      Rate and Rhythm: Normal rate and regular rhythm.      Pulses:           Radial pulses are 2+ on the right side and 2+ on the left side.      Heart sounds: S1 normal and S2 normal. Murmur heard.   No friction rub. Gallop present. S4 sounds present.       Comments: 1/6 TR  Pulmonary:      Effort: Pulmonary effort is normal.      Breath sounds: Normal breath sounds. No wheezing, rhonchi or rales.      Comments: Nl. Expir. Phase  Nl. Breath sound intensity    Abdominal:      General: Bowel sounds are normal. There is no distension or abdominal bruit.      Palpations: Abdomen is soft. There is no mass.      Tenderness: There is no abdominal tenderness. There is no guarding or rebound.      Comments: No organomegaly   Musculoskeletal:         General: No tenderness or deformity. Normal range of motion.      Cervical back: Normal range of motion and neck supple.      Right lower leg: Edema present.      Left lower leg: Edema present.      Comments: BLE, trace edema, palpable pedal pulses   Skin:     General: Skin is warm and dry.      Coloration: Skin is not pale.      Findings: No erythema or rash.   Neurological:      Mental Status: She is alert and oriented to person, place, and time.   Psychiatric:         Behavior: Behavior normal.         Thought Content: Thought content normal.         Judgment: Judgment normal.           ECG 12 Lead    Date/Time: 8/31/2021 3:44 PM  Performed by: Jameson Franks MD  Authorized by: Jameson Franks MD   Comparison: compared with previous ECG from 6/23/2020  Similar to previous ECG  Comments: Normal sinus rhythm at 67.  Left anterior  fascicular block.  No change from prior.              Assessment/Plan   #1.  Episodic dizziness and vertigo.  I do not feel these are cardiac related and symptoms have been stable over a period of years.  No further evaluation or treatment changes warranted at this time.    2.  History of TIA.  The patient describes right-sided dyspraxia and weakness which resolved spontaneously.  The patient has had no recurrence of event appropriate medical therapy.    3.  Systemic hypertension.  The patient has a definite whitecoat component but overall blood pressures seem reasonably well controlled.  The patient will follow with Dr. Nye in that regard.    4.  Leukocytosis.  See discussion under history of present illness above.  The patient is seen hematology oncology with no specific treatment recommended.    5.  Reviewed today symptoms of myocardial ischemia, heart failure, malignant dysrhythmia, peripheral arterial disease.  The patient will report any of these immediately.  The patient also understands that any neurologic symptoms different than those she experiences on a chronic basis should be considered an emergency.  She will report to the emergency room or activate emergency medical service service for the same.  Otherwise, Ms. corrales will follow with Dr. Nye as instructed we will plan on seeing her in follow-up in 1 year.   Diagnosis Plan   1. Systolic ejection murmur     2. Mixed hyperlipidemia     3. Essential hypertension     4. Bilateral carotid artery stenosis     5. History of TIA (transient ischemic attack)     6. Shortness of breath     7. Syncope, unspecified syncope type     8. Dizziness     9. Chronic fatigue     10. Anergy         No follow-ups on file.         Deborah Corrales  reports that she has never smoked. She has never used smokeless tobacco.. I have educated her on the risk of diseases from using tobacco products such as cancer, COPD and heart disease.          Advance Care Planning   ACP  discussion was held with the patient during this visit. Patient does not have an advance directive, declines further assistance.     Patient's Body mass index is 30.34 kg/m². indicating that she is obese (BMI >30). Obesity-related health conditions include the following: hypertension, diabetes mellitus, dyslipidemias and tia, thyroid disease. Obesity is unchanged. BMI is pcp addressing. We discussed portion control and increasing exercise..       Debbie Fabian LPN    Scribed for Dr. Jameson Franks by Debbie Fabian LPN August 31, 2021 15:12 EDT         Electronically signed by:            This note is dictated utilizing voice recognition software.  Although this record has been proof read, transcriptional errors may still be present. If questions occur regarding the content of this record please do not hesitate to call our office.

## 2021-12-10 RX ORDER — AMLODIPINE BESYLATE 2.5 MG/1
TABLET ORAL
Qty: 90 TABLET | Refills: 3 | Status: SHIPPED | OUTPATIENT
Start: 2021-12-10 | End: 2022-11-22

## 2022-06-13 DIAGNOSIS — I10 ESSENTIAL HYPERTENSION: Primary | ICD-10-CM

## 2022-06-13 RX ORDER — LISINOPRIL 10 MG/1
TABLET ORAL
Qty: 90 TABLET | Refills: 0 | Status: SHIPPED | OUTPATIENT
Start: 2022-06-13 | End: 2022-11-22

## 2022-08-30 ENCOUNTER — OFFICE VISIT (OUTPATIENT)
Dept: CARDIOLOGY | Facility: CLINIC | Age: 77
End: 2022-08-30

## 2022-08-30 VITALS
DIASTOLIC BLOOD PRESSURE: 64 MMHG | OXYGEN SATURATION: 97 % | HEIGHT: 63 IN | HEART RATE: 80 BPM | WEIGHT: 177.8 LBS | BODY MASS INDEX: 31.5 KG/M2 | SYSTOLIC BLOOD PRESSURE: 136 MMHG

## 2022-08-30 DIAGNOSIS — E78.2 MIXED HYPERLIPIDEMIA: ICD-10-CM

## 2022-08-30 DIAGNOSIS — R42 DIZZINESS: ICD-10-CM

## 2022-08-30 DIAGNOSIS — I10 ESSENTIAL HYPERTENSION: ICD-10-CM

## 2022-08-30 DIAGNOSIS — R53.82 CHRONIC FATIGUE: ICD-10-CM

## 2022-08-30 DIAGNOSIS — R55 SYNCOPE, UNSPECIFIED SYNCOPE TYPE: ICD-10-CM

## 2022-08-30 DIAGNOSIS — Z86.73 HISTORY OF TIA (TRANSIENT ISCHEMIC ATTACK): ICD-10-CM

## 2022-08-30 DIAGNOSIS — R01.1 SYSTOLIC EJECTION MURMUR: ICD-10-CM

## 2022-08-30 DIAGNOSIS — I65.23 BILATERAL CAROTID ARTERY STENOSIS: Primary | ICD-10-CM

## 2022-08-30 DIAGNOSIS — R06.02 SHORTNESS OF BREATH: ICD-10-CM

## 2022-08-30 PROCEDURE — 93000 ELECTROCARDIOGRAM COMPLETE: CPT | Performed by: INTERNAL MEDICINE

## 2022-08-30 PROCEDURE — 99214 OFFICE O/P EST MOD 30 MIN: CPT | Performed by: INTERNAL MEDICINE

## 2022-08-30 NOTE — PROGRESS NOTES
Subjective   Deborah Corrales is a 77 y.o. female     Chief Complaint   Patient presents with   • Follow-up     Here for yearly f/u   • Hyperlipidemia   • Heart Murmur   • Hypertension   • Carotid Artery Disease   • Transient Ischemic Attack       PROBLEM LIST:     1. Fatigue   2. Hyperlipidemia   3. Hypertension   4. Thyroid disease   5. H/o TIA  6. Diabetes   7. Family H/o CAD   8. Carotid artery stenosis   8.1 carotid duplex 05/02/17 Potentially hemodynamically significant disease in the proximal right internal carotid artery.   8.2 CTA neck 06/02/17 stenosis HECTOR approximal  50%, stenosis LICA less than 40%. No flow identified in right vertebral artery, left vertebral artery is patent and extends to fill the basilar artery.    Specialty Problems        Cardiology Problems    Carotid artery stenosis        Essential hypertension        Mixed hyperlipidemia        Systolic ejection murmur                HPI:  Ms. New returns for follow-up on the above.    She has done well since the time of her last visit.  She denies any type of chest discomfort.  She also denies orthopnea or PND.  She has chronic lower extremity edema which remains unchanged.  She senses no palpitations.  The patient does relate intermittent dizziness.  She notices this with rapid head movement, with turning her head side to extreme lateral gaze, and sometimes notices that the symptoms are followed by very short-lived vertigo.  Symptoms have been present and stable for years.  CTA results were reviewed.  The patient had 50% right internal carotid artery stenosis but her previous TIA symptoms were in the distribution of the left middle cerebral artery.  Therefore, I do not feel that mechanical intervention is indicated at this time.    Blood pressures have been well controlled.  The patient does not claudicate or describe other symptoms of peripheral arterial disease and she has had no symptoms of arterial embolic events including no symptoms of  recurrent cerebral ischemia.  At the time of her last visit Ms. miguel had elevation of multiple cell lines on her CBC.  However, most recent labs demonstrated a white count of 9.6 H&H of 13 and 41, and a platelet count of 340,000.  We have no recent fasting lipid profile data.                        PRIOR MEDICATIONS    Current Outpatient Medications on File Prior to Visit   Medication Sig Dispense Refill   • amLODIPine (NORVASC) 2.5 MG tablet TAKE 1 TABLET EVERY DAY 90 tablet 3   • aspirin 325 MG tablet Take 325 mg by mouth Daily.     • Cholecalciferol (Vitamin D3) 50 MCG (2000 UT) capsule Take 2,000 Units by mouth Daily.     • dorzolamide-timolol (COSOPT) 22.3-6.8 MG/ML ophthalmic solution Administer 1 drop to the right eye 2 (two) times a day.     • glimepiride (AMARYL) 4 MG tablet Daily.     • levothyroxine (SYNTHROID, LEVOTHROID) 88 MCG tablet Take 88 mcg by mouth Daily.     • lisinopril (PRINIVIL,ZESTRIL) 10 MG tablet TAKE 1 TABLET EVERY DAY 90 tablet 0   • omeprazole (priLOSEC) 40 MG capsule Take 40 mg by mouth Daily.     • pravastatin (PRAVACHOL) 20 MG tablet Take 20 mg by mouth Daily.     • [DISCONTINUED] bimatoprost (LUMIGAN) 0.01 % ophthalmic drops 1 drop Every Night.       No current facility-administered medications on file prior to visit.       ALLERGIES:    Patient has no known allergies.    PAST MEDICAL HISTORY:    Past Medical History:   Diagnosis Date   • Cancer (HCC)    • Diabetes mellitus (HCC)    • GERD (gastroesophageal reflux disease)    • Hyperlipidemia    • Hypertension    • Hypothyroidism    • Non Hodgkin's lymphoma (HCC)    • Non Hodgkin's lymphoma (HCC)    • Rupture of bowel (HCC)     2014    • TIA (transient ischemic attack)        SURGICAL HISTORY:    Past Surgical History:   Procedure Laterality Date   • CATARACT EXTRACTION     • CHOLECYSTECTOMY OPEN     • EYE SURGERY     • TUMOR REMOVAL      under jaw       SOCIAL HISTORY:    Social History     Socioeconomic History   • Marital status:  "Single   Tobacco Use   • Smoking status: Never Smoker   • Smokeless tobacco: Never Used   Substance and Sexual Activity   • Alcohol use: No   • Drug use: No   • Sexual activity: Defer       FAMILY HISTORY:    Family History   Problem Relation Age of Onset   • Heart disease Mother    • COPD Mother    • Pancreatic cancer Father    • Heart disease Maternal Uncle        Review of Systems   Constitutional: Positive for fatigue.        Decreased strength and stamina compared to a year ago. R/T chronic neck spurs and arthritis. Liane. With activity has extreme pain.    HENT: Negative.    Eyes: Positive for visual disturbance (glasses prn).   Respiratory: Positive for shortness of breath (occas. ).         Denies orthopnea/PND   Cardiovascular: Positive for leg swelling (lt. ankle \"stays that way all the time\"). Negative for chest pain and palpitations.   Gastrointestinal: Positive for constipation.   Endocrine: Negative.    Genitourinary: Negative.    Musculoskeletal: Positive for arthralgias, myalgias and neck pain.        Denies led gramps with ambulation   Skin: Negative.    Allergic/Immunologic: Negative.    Neurological: Positive for dizziness (occas. ). Negative for syncope (last episode 3-4 yrs. ago was in [place], fell over by bathtub and lightheaded. then felt \"like bathtub was falling over on me\").   Hematological: Negative.    Psychiatric/Behavioral: Negative.        VISIT VITALS:  Vitals:    08/30/22 0931   BP: 166/77   BP Location: Left arm   Patient Position: Sitting   Pulse: 80   SpO2: 97%   Weight: 80.6 kg (177 lb 12.8 oz)   Height: 160 cm (63\")      /77 (BP Location: Left arm, Patient Position: Sitting)   Pulse 80   Ht 160 cm (63\")   Wt 80.6 kg (177 lb 12.8 oz)   SpO2 97%   BMI 31.50 kg/m²     RECENT LABS:    Objective       Physical Exam  Vitals and nursing note reviewed.   Constitutional:       General: She is not in acute distress.     Appearance: She is well-developed.   HENT:      Head: " Normocephalic and atraumatic.   Eyes:      Conjunctiva/sclera: Conjunctivae normal.      Pupils: Pupils are equal, round, and reactive to light.   Neck:      Vascular: No carotid bruit, hepatojugular reflux or JVD.      Trachea: No tracheal deviation.      Comments: Nl. Carotid upstrokes  Cardiovascular:      Rate and Rhythm: Normal rate and regular rhythm.      Pulses:           Radial pulses are 2+ on the right side and 2+ on the left side.      Heart sounds: S1 normal and S2 normal. No murmur heard.    No friction rub. Gallop present. S4 sounds present. No S3 sounds.   Pulmonary:      Effort: Pulmonary effort is normal.      Breath sounds: Normal breath sounds. No wheezing, rhonchi or rales.      Comments: Nl. Expir. Phase  Nl. Breath sound intensity    Abdominal:      General: Bowel sounds are normal. There is no distension or abdominal bruit.      Palpations: Abdomen is soft. There is no mass.      Tenderness: There is no abdominal tenderness. There is no guarding or rebound.      Comments: No organomegaly   Musculoskeletal:         General: No tenderness or deformity. Normal range of motion.      Cervical back: Normal range of motion and neck supple.      Right lower leg: No edema.      Left lower leg: Edema present.      Comments: LLE, trace edema, palpable pedal pulses  RLE, no edema, palpable pedal pulses   Skin:     General: Skin is warm and dry.      Coloration: Skin is not pale.      Findings: No erythema or rash.   Neurological:      Mental Status: She is alert and oriented to person, place, and time.   Psychiatric:         Behavior: Behavior normal.         Thought Content: Thought content normal.         Judgment: Judgment normal.           ECG 12 Lead    Date/Time: 8/30/2022 9:35 AM  Performed by: Jameson Franks MD  Authorized by: Jameson Franks MD   Comparison: compared with previous ECG from 8/31/2021  Similar to previous ECG              Assessment & Plan   #1.  Peripheral arterial  disease.  See discussion under history of present illness above.  I do not think the patient is a candidate for mechanical revascularization in the right internal carotid artery distribution.  We will continue antiplatelet therapy, statin, blood pressure management, and follow closely clinically.    2.  Episodic dizziness and occasional vertigo.  Findings are nonfocal and relate predominantly to head movement and eye movement.  We will defer the need for further evaluation to Dr. Nye.    3.  Controlled systemic hypertension.    4.  Controlled dyslipidemia.    5.  Lower extremity edema which is multifactorial and stable.    6.  We discussed today symptoms which might represent myocardial ischemia, heart failure, or malignant dysrhythmia.  The patient report any of these immediately.  She understands any recurrent focal neurologic defect should prompt activation of emergency medical services.    Otherwise, the patient will follow with Dr. Nye as instructed and in our office in 1 year.     Diagnosis Plan   1. Bilateral carotid artery stenosis     2. Essential hypertension     3. Mixed hyperlipidemia     4. Systolic ejection murmur     5. History of TIA (transient ischemic attack)     6. Shortness of breath     7. Chronic fatigue     8. Dizziness     9. Syncope, unspecified syncope type         No follow-ups on file.         Deborah Corrales  reports that she has never smoked. She has never used smokeless tobacco.. I have educated her on the risk of diseases from using tobacco products such as cancer, COPD and heart disease.          Advance Care Planning   ACP discussion was held with the patient during this visit. Patient does not have an advance directive, declines further assistance.        BMI is >= 30 and <35. (Class 1 Obesity). The following options were offered after discussion;: pcp addressing             Electronically signed by:    Scribed for Jameson Franks MD by Debbie Fabian LPN on August 30, 2022   at 09:35 EDT    I, Jameson Franks MD personally performed the services described in this documentation as scribed by the above named individual in my presence, and it is both accurate and complete. August 30, 2022 09:35 EDT      Dictated Utilizing Dragon Dictation: Part of this note may be an electronic transcription/translation of spoken language to printed text using the Dragon Dictation System.

## 2022-11-22 DIAGNOSIS — I10 ESSENTIAL HYPERTENSION: ICD-10-CM

## 2022-11-22 RX ORDER — LISINOPRIL 10 MG/1
TABLET ORAL
Qty: 90 TABLET | Refills: 3 | Status: SHIPPED | OUTPATIENT
Start: 2022-11-22

## 2022-11-22 RX ORDER — AMLODIPINE BESYLATE 2.5 MG/1
TABLET ORAL
Qty: 90 TABLET | Refills: 3 | Status: SHIPPED | OUTPATIENT
Start: 2022-11-22

## 2023-08-30 ENCOUNTER — OFFICE VISIT (OUTPATIENT)
Dept: CARDIOLOGY | Facility: CLINIC | Age: 78
End: 2023-08-30
Payer: MEDICARE

## 2023-08-30 VITALS
HEIGHT: 66 IN | HEART RATE: 62 BPM | OXYGEN SATURATION: 97 % | SYSTOLIC BLOOD PRESSURE: 145 MMHG | BODY MASS INDEX: 27.83 KG/M2 | WEIGHT: 173.2 LBS | DIASTOLIC BLOOD PRESSURE: 72 MMHG

## 2023-08-30 DIAGNOSIS — R01.1 SYSTOLIC EJECTION MURMUR: ICD-10-CM

## 2023-08-30 DIAGNOSIS — R55 SYNCOPE, UNSPECIFIED SYNCOPE TYPE: ICD-10-CM

## 2023-08-30 DIAGNOSIS — I65.23 BILATERAL CAROTID ARTERY STENOSIS: Primary | ICD-10-CM

## 2023-08-30 DIAGNOSIS — E78.2 MIXED HYPERLIPIDEMIA: ICD-10-CM

## 2023-08-30 DIAGNOSIS — R06.02 SHORTNESS OF BREATH: ICD-10-CM

## 2023-08-30 DIAGNOSIS — R53.82 CHRONIC FATIGUE: ICD-10-CM

## 2023-08-30 DIAGNOSIS — I10 ESSENTIAL HYPERTENSION: ICD-10-CM

## 2023-08-30 DIAGNOSIS — R42 DIZZINESS: ICD-10-CM

## 2023-08-30 PROCEDURE — 3078F DIAST BP <80 MM HG: CPT | Performed by: INTERNAL MEDICINE

## 2023-08-30 PROCEDURE — 99214 OFFICE O/P EST MOD 30 MIN: CPT | Performed by: INTERNAL MEDICINE

## 2023-08-30 PROCEDURE — 93000 ELECTROCARDIOGRAM COMPLETE: CPT | Performed by: INTERNAL MEDICINE

## 2023-08-30 PROCEDURE — 3077F SYST BP >= 140 MM HG: CPT | Performed by: INTERNAL MEDICINE

## 2023-08-30 RX ORDER — ESTRADIOL 0.1 MG/G
CREAM VAGINAL
COMMUNITY
Start: 2023-06-20

## 2023-08-30 NOTE — PROGRESS NOTES
Subjective   Deborah Miguel is a 78 y.o. female     Chief Complaint   Patient presents with    Follow-up     Here for yearly f/u    Hyperlipidemia    Hypertension    Carotid Artery Disease    Heart Murmur    Transient Ischemic Attack       PROBLEM LIST:        1. Fatigue   2. Hyperlipidemia   3. Hypertension   4. Thyroid disease   5. H/o TIA  6. Diabetes   7. Family H/o CAD   8. Carotid artery stenosis   8.1 carotid duplex 05/02/17 Potentially hemodynamically significant disease in the proximal right internal carotid artery.   8.2 CTA neck 06/02/17 stenosis HECTOR approximal  50%, stenosis LICA less than 40%. No flow identified in right vertebral artery, left vertebral artery is patent and extends to fill the basilar artery.  9. Event monitor, 8- - 9-. abdoulaye JAMA at 46, [facility]    Specialty Problems          Cardiology Problems    Carotid artery stenosis        Essential hypertension        Mixed hyperlipidemia        Systolic ejection murmur             HPI:    Ms. New returns for follow-up on the above.    She has done well over the last year.  She stopped line dancing because of left knee pain but she is continue to pursue michell chi.  She has stable functional capacity.  She has had no current symptoms of TIA or stroke on current medications.  She denies orthopnea, PND, or lower extremity edema.  She continues to have mild imbalance with rapid head and eye movement.  Symptoms are unchanged from prior.    Ms. miguel states that her blood pressure is controlled when checked at Dr. Nye office.  She has not checked blood pressures at home.  We have no recent fasting lipid profile data.    Event monitor demonstrated no atrial fibrillation and in fact no significant dysrhythmic substrate.                      PRIOR MEDICATIONS    Current Outpatient Medications on File Prior to Visit   Medication Sig Dispense Refill    amLODIPine (NORVASC) 2.5 MG tablet TAKE 1 TABLET EVERY DAY 90 tablet 3    aspirin 325 MG  tablet Take 1 tablet by mouth Daily.      Cholecalciferol (Vitamin D3) 50 MCG (2000 UT) capsule Take 1 capsule by mouth Daily.      dorzolamide-timolol (COSOPT) 22.3-6.8 MG/ML ophthalmic solution Administer 1 drop to the right eye 2 (two) times a day.      estradiol (ESTRACE) 0.1 MG/GM vaginal cream 3 x weekly      glimepiride (AMARYL) 4 MG tablet Daily.      levothyroxine (SYNTHROID, LEVOTHROID) 88 MCG tablet Take 1 tablet by mouth Daily.      lisinopril (PRINIVIL,ZESTRIL) 10 MG tablet TAKE 1 TABLET EVERY DAY 90 tablet 3    omeprazole (priLOSEC) 40 MG capsule Take 1 capsule by mouth Daily.      pravastatin (PRAVACHOL) 20 MG tablet Take 1 tablet by mouth Daily.       No current facility-administered medications on file prior to visit.       ALLERGIES:    Patient has no known allergies.    PAST MEDICAL HISTORY:    Past Medical History:   Diagnosis Date    Cancer     Diabetes mellitus     GERD (gastroesophageal reflux disease)     Hyperlipidemia     Hypertension     Hypothyroidism     Non Hodgkin's lymphoma     Non Hodgkin's lymphoma     Rupture of bowel     2014     TIA (transient ischemic attack)        SURGICAL HISTORY:    Past Surgical History:   Procedure Laterality Date    CATARACT EXTRACTION      CHOLECYSTECTOMY OPEN      EYE SURGERY      TUMOR REMOVAL      under jaw       SOCIAL HISTORY:    Social History     Socioeconomic History    Marital status: Single   Tobacco Use    Smoking status: Never    Smokeless tobacco: Never   Substance and Sexual Activity    Alcohol use: No    Drug use: No    Sexual activity: Defer       FAMILY HISTORY:    Family History   Problem Relation Age of Onset    Heart disease Mother     COPD Mother     Pancreatic cancer Father     Heart disease Maternal Uncle        Review of Systems   Constitutional:  Positive for fatigue.   HENT: Negative.     Eyes:  Positive for visual disturbance (glasses prn).   Respiratory:  Positive for shortness of breath (occas.).         Denies orthopnea/PND  "  Cardiovascular: Negative.    Gastrointestinal:  Positive for constipation.   Endocrine: Negative.    Genitourinary: Negative.    Musculoskeletal:  Positive for arthralgias and myalgias.   Skin: Negative.    Allergic/Immunologic: Negative.    Neurological:  Positive for light-headedness (occas.). Negative for syncope.        Denies stroke like sx's   Hematological: Negative.    Psychiatric/Behavioral: Negative.       VISIT VITALS:  Vitals:    08/30/23 1041   BP: 145/72   BP Location: Left arm   Patient Position: Sitting   Pulse: 62   SpO2: 97%   Weight: 78.6 kg (173 lb 3.2 oz)   Height: 167.6 cm (66\")      /72 (BP Location: Left arm, Patient Position: Sitting)   Pulse 62   Ht 167.6 cm (66\")   Wt 78.6 kg (173 lb 3.2 oz)   SpO2 97%   BMI 27.96 kg/mý     RECENT LABS:    Objective       Physical Exam  Vitals and nursing note reviewed.   Constitutional:       General: She is not in acute distress.     Appearance: She is well-developed.   HENT:      Head: Normocephalic and atraumatic.   Eyes:      Conjunctiva/sclera: Conjunctivae normal.      Pupils: Pupils are equal, round, and reactive to light.   Neck:      Vascular: No carotid bruit, hepatojugular reflux or JVD.      Trachea: No tracheal deviation.      Comments: Nl. Carotid upstrokes  Cardiovascular:      Rate and Rhythm: Normal rate and regular rhythm.      Pulses:           Radial pulses are 2+ on the right side and 2+ on the left side.      Heart sounds: Normal heart sounds, S1 normal and S2 normal. No murmur heard.    No friction rub. No S3 or S4 sounds.   Pulmonary:      Effort: Pulmonary effort is normal.      Breath sounds: Normal breath sounds. No wheezing, rhonchi or rales.      Comments: Nl. Expir. Phase  Nl. Breath sound intensity    Abdominal:      General: Bowel sounds are normal. There is no distension or abdominal bruit.      Palpations: Abdomen is soft. There is no mass.      Tenderness: There is no abdominal tenderness. There is no " guarding or rebound.      Comments: No organomegaly   Musculoskeletal:         General: No tenderness or deformity. Normal range of motion.      Cervical back: Normal range of motion and neck supple.      Right lower leg: No edema.      Left lower leg: No edema.      Comments: BLE, no edema, excellent DP pedal pulses     Skin:     General: Skin is warm and dry.      Coloration: Skin is not pale.      Findings: No erythema or rash.   Neurological:      Mental Status: She is alert and oriented to person, place, and time.   Psychiatric:         Behavior: Behavior normal.         Thought Content: Thought content normal.         Judgment: Judgment normal.         ECG 12 Lead    Date/Time: 8/30/2023 10:49 AM  Performed by: Jameson Franks MD  Authorized by: Jameson Franks MD   Comparison: compared with previous ECG from 8/30/2022  Comments: Sinus bradycardia at 53.  Left anterior fascicular block.  No change from prior.          Assessment & Plan   #1.  History of TIA.  The patient had noncritical carotid disease by prior duplex and CTA.  She has had no recurrence of symptoms on current medications.  We will continue as present.    2.  Peripheral arterial disease with less than 50% carotid stenosis as described.  The patient is not on high-dose statin.  We will need to check fasting lipid profile and liver enzymes to ensure adequate control of dyslipidemia on current medications.    3.  Systemic hypertension.  Blood pressures are mildly elevated today.  We will defer to Dr. Nye with regard to the need for increased pharmacotherapy.    5.  Easy fatigability.  The patient remains moderately physically active without change.  We will continue to monitor.    6.  Ms. miguel will follow with Dr. Nye as instructed we will plan on seeing her in follow-up in 1 year or on a as needed basis for symptoms as discussed in detail today.   Diagnosis Plan   1. Bilateral carotid artery stenosis        2. Essential hypertension         3. Mixed hyperlipidemia        4. Systolic ejection murmur        5. Shortness of breath        6. Chronic fatigue        7. Syncope, unspecified syncope type        8. Dizziness            No follow-ups on file.         Deborah Corrales  reports that she has never smoked. She has never used smokeless tobacco.. I have educated her on the risk of diseases from using tobacco products such as cancer, COPD, and heart disease.        Advance Care Planning   ACP discussion was held with the patient during this visit. Patient does not have an advance directive, declines further assistance.         BMI is >= 25 and <30. (Overweight) The following options were offered after discussion;: pcp addressing               Electronically signed by:    Scribed for Jameson Franks MD by Debbie Fabian LPN on August 30, 2023  at 10:49 EDT    I, Jameson Franks MD personally performed the services described in this documentation as scribed by the above named individual in my presence, and it is both accurate and complete. August 30, 2023 10:49 EDT      Dictated Utilizing Dragon Dictation: Part of this note may be an electronic transcription/translation of spoken language to printed text using the Dragon Dictation System.

## 2023-09-07 DIAGNOSIS — E78.2 MIXED HYPERLIPIDEMIA: ICD-10-CM

## 2023-09-07 DIAGNOSIS — I65.23 BILATERAL CAROTID ARTERY STENOSIS: ICD-10-CM

## 2023-09-08 ENCOUNTER — TELEPHONE (OUTPATIENT)
Dept: CARDIOLOGY | Facility: CLINIC | Age: 78
End: 2023-09-08
Payer: MEDICARE

## 2023-09-08 NOTE — TELEPHONE ENCOUNTER
PATIENT AWARE OF LABS AND STTAED DR. LIU CALLED [facility] YEST. AND WAS PLEASED WITH THEN. AWARE TO KEEP F/U APPT. SEVERO CHAND              ----- Message from Jameson Franks MD sent at 9/7/2023  1:52 PM EDT -----    ----- Message -----  From: Joleen Aguero RegSched Rep  Sent: 9/7/2023  11:44 AM EDT  To: Jameson Franks MD

## 2024-01-03 DIAGNOSIS — I10 ESSENTIAL HYPERTENSION: ICD-10-CM

## 2024-01-03 NOTE — TELEPHONE ENCOUNTER
Name from pharmacy: AMLODIPINE BESYLATE 2.5 MG Tablet         Will file in chart as: amLODIPine (NORVASC) 2.5 MG tablet    Sig: TAKE 1 TABLET EVERY DAY    Disp: 90 tablet    Refills: 3    Start: 1/3/2024    Class: Normal    Non-formulary    Last ordered: 1 year ago (11/22/2022) by Jameson Franks MD    Last refill: 10/19/2023    Rx #: [id number]    Calcium-Channel Blockers Protocol Omauhf0101/03/2024 04:15 PM   Protocol Details Normal serum potassium in past 12 months    BP under 130/80 in past year and patient has diabetes, CAD or PVD    GFR> 30 ml/min in past 12 months    No active pregnancy on record    No positive pregnancy test in past 12 months    Recent or future visit with authorizing provider       Name from pharmacy: LISINOPRIL 10 MG Tablet         Will file in chart as: lisinopril (PRINIVIL,ZESTRIL) 10 MG tablet    Sig: TAKE 1 TABLET EVERY DAY    Disp: 90 tablet    Refills: 3    Start: 1/3/2024    Class: Normal    Non-formulary For: Essential hypertension    Last ordered: 1 year ago (11/22/2022) by Jameson Franks MD    Last refill: 10/19/2023    Rx #: [id number]    ACE Inhibitors Protocol Mqzmde2601/03/2024 04:15 PM   Protocol Details Normal serum potassium in past 12 months    Blood pressure on record    Most recent eGFR is above 30 in the past 12 months.    No active pregnancy on record    No positive pregnancy test in past 12 months    Patient is not on Entresto    Patient is not on an ARB    Recent or future visit with authorizing provider

## 2024-01-04 RX ORDER — AMLODIPINE BESYLATE 2.5 MG/1
TABLET ORAL
Qty: 90 TABLET | Refills: 3 | Status: SHIPPED | OUTPATIENT
Start: 2024-01-04

## 2024-01-04 RX ORDER — LISINOPRIL 10 MG/1
TABLET ORAL
Qty: 90 TABLET | Refills: 3 | Status: SHIPPED | OUTPATIENT
Start: 2024-01-04

## 2024-09-04 ENCOUNTER — OFFICE VISIT (OUTPATIENT)
Dept: CARDIOLOGY | Facility: CLINIC | Age: 79
End: 2024-09-04
Payer: MEDICARE

## 2024-09-04 VITALS
HEART RATE: 67 BPM | OXYGEN SATURATION: 99 % | WEIGHT: 167.4 LBS | HEIGHT: 66 IN | DIASTOLIC BLOOD PRESSURE: 66 MMHG | SYSTOLIC BLOOD PRESSURE: 126 MMHG | BODY MASS INDEX: 26.9 KG/M2

## 2024-09-04 DIAGNOSIS — R55 SYNCOPE, UNSPECIFIED SYNCOPE TYPE: ICD-10-CM

## 2024-09-04 DIAGNOSIS — E78.2 MIXED HYPERLIPIDEMIA: ICD-10-CM

## 2024-09-04 DIAGNOSIS — Z86.73 HISTORY OF TIA (TRANSIENT ISCHEMIC ATTACK): ICD-10-CM

## 2024-09-04 DIAGNOSIS — I10 ESSENTIAL HYPERTENSION: ICD-10-CM

## 2024-09-04 DIAGNOSIS — I65.23 BILATERAL CAROTID ARTERY STENOSIS: Primary | ICD-10-CM

## 2024-09-04 DIAGNOSIS — R06.02 SHORTNESS OF BREATH: ICD-10-CM

## 2024-09-04 DIAGNOSIS — R01.1 SYSTOLIC EJECTION MURMUR: ICD-10-CM

## 2024-09-04 PROCEDURE — 3074F SYST BP LT 130 MM HG: CPT | Performed by: INTERNAL MEDICINE

## 2024-09-04 PROCEDURE — 93000 ELECTROCARDIOGRAM COMPLETE: CPT | Performed by: INTERNAL MEDICINE

## 2024-09-04 PROCEDURE — 3078F DIAST BP <80 MM HG: CPT | Performed by: INTERNAL MEDICINE

## 2024-09-04 PROCEDURE — 99214 OFFICE O/P EST MOD 30 MIN: CPT | Performed by: INTERNAL MEDICINE

## 2024-09-04 RX ORDER — CELECOXIB 200 MG/1
200 CAPSULE ORAL DAILY
COMMUNITY
Start: 2024-08-07

## 2024-09-04 NOTE — PROGRESS NOTES
Subjective   Deborah Miguel is a 79 y.o. female     Chief Complaint   Patient presents with    Follow-up     Here for yearly f/u    Hyperlipidemia    Hypertension    Heart Murmur    Carotid Artery Disease    Transient Ischemic Attack       PROBLEM LIST:     1. Fatigue   2. Hyperlipidemia   3. Hypertension   4. Thyroid disease   5. H/o TIA  6. Diabetes   7. Family H/o CAD   8. Carotid artery stenosis   8.1 carotid duplex 05/02/17 Potentially hemodynamically significant disease in the proximal right internal carotid artery.   8.2 CTA neck 06/02/17 stenosis HECTOR approximal  50%, stenosis LICA less than 40%. No flow identified in right vertebral artery, left vertebral artery is patent and extends to fill the basilar artery.  9. Event monitor, 8- - 9-. abdoulaye JAMA at 46, [facility]    Specialty Problems          Cardiology Problems    Carotid artery stenosis        Essential hypertension        Mixed hyperlipidemia        Systolic ejection murmur             HPI:  Ms. New returns for follow-up on the above.    She has done well over the last year.  She continues to be without chest pain, orthopnea, PND, or lower extremity edema.  She senses no palpitations.  She continues to have mild dizziness with rapid head movement but she is not orthostatic and she denies presyncope or syncope.  She has had no recurrence of TIA or stroke on current medical therapy.  Blood pressures have been well-controlled.  Lipids from June demonstrated total cholesterol 156, triglycerides of 110, HDL 59, and LDL of 75.  Hemoglobin A1c was 6.5.    Ms. miguel continues to be physically active.  She does michell chi twice weekly and is now doing exercise bingo.  When asked to clarify she describes that participants perform exercise between numbers being called.  She feels her strength and stamina have remained unchanged.  She has had no new injuries, illnesses, or hospitalizations.                      PRIOR MEDICATIONS    Current Outpatient  Medications on File Prior to Visit   Medication Sig Dispense Refill    amLODIPine (NORVASC) 2.5 MG tablet TAKE 1 TABLET EVERY DAY 90 tablet 3    aspirin 325 MG tablet Take 1 tablet by mouth Daily.      celecoxib (CeleBREX) 200 MG capsule Take 1 capsule by mouth Daily.      cholecalciferol (VITAMIN D3) 25 MCG (1000 UT) tablet Take 1 tablet by mouth Daily.      dorzolamide-timolol (COSOPT) 22.3-6.8 MG/ML ophthalmic solution Administer 1 drop to the right eye 2 (two) times a day.      estradiol (ESTRACE) 0.1 MG/GM vaginal cream 3 x weekly      glimepiride (AMARYL) 4 MG tablet Daily.      levothyroxine (SYNTHROID, LEVOTHROID) 88 MCG tablet Take 1 tablet by mouth Daily.      lisinopril (PRINIVIL,ZESTRIL) 10 MG tablet TAKE 1 TABLET EVERY DAY 90 tablet 3    Multiple Vitamins-Minerals (PRESERVISION AREDS 2 PO) Take  by mouth Daily.      omeprazole (priLOSEC) 40 MG capsule Take 1 capsule by mouth Daily.      pravastatin (PRAVACHOL) 20 MG tablet Take 1 tablet by mouth Daily.       No current facility-administered medications on file prior to visit.       ALLERGIES:    Patient has no known allergies.    PAST MEDICAL HISTORY:    Past Medical History:   Diagnosis Date    Cancer     Diabetes mellitus     GERD (gastroesophageal reflux disease)     Hyperlipidemia     Hypertension     Hypothyroidism     Non Hodgkin's lymphoma     Non Hodgkin's lymphoma     Rupture of bowel     2014     TIA (transient ischemic attack)        SURGICAL HISTORY:    Past Surgical History:   Procedure Laterality Date    CATARACT EXTRACTION      CHOLECYSTECTOMY OPEN      EYE SURGERY      TUMOR REMOVAL      under jaw       SOCIAL HISTORY:    Social History     Socioeconomic History    Marital status: Single   Tobacco Use    Smoking status: Never    Smokeless tobacco: Never   Substance and Sexual Activity    Alcohol use: No    Drug use: No    Sexual activity: Defer       FAMILY HISTORY:    Family History   Problem Relation Age of Onset    Heart disease  "Mother     COPD Mother     Pancreatic cancer Father     Heart disease Maternal Uncle        Review of Systems   Constitutional:  Positive for fatigue.   HENT: Negative.     Eyes:  Positive for visual disturbance (glasses prn).   Respiratory:  Positive for shortness of breath (occas.).    Cardiovascular: Negative.    Gastrointestinal:  Positive for constipation.   Endocrine: Negative.    Genitourinary: Negative.    Musculoskeletal:  Positive for arthralgias and myalgias.   Skin: Negative.    Allergic/Immunologic: Positive for environmental allergies.   Neurological:  Positive for light-headedness (occas.). Negative for syncope.   Hematological: Negative.    Psychiatric/Behavioral: Negative.         VISIT VITALS:  Vitals:    09/04/24 1034   BP: 126/66   BP Location: Left arm   Patient Position: Sitting   Pulse: 67   SpO2: 99%   Weight: 75.9 kg (167 lb 6.4 oz)   Height: 167.6 cm (66\")      /66 (BP Location: Left arm, Patient Position: Sitting)   Pulse 67   Ht 167.6 cm (66\")   Wt 75.9 kg (167 lb 6.4 oz)   SpO2 99%   BMI 27.02 kg/m²     RECENT LABS:    Objective       Physical Exam  Vitals and nursing note reviewed.   Constitutional:       General: She is not in acute distress.     Appearance: She is well-developed.   HENT:      Head: Normocephalic and atraumatic.   Eyes:      Conjunctiva/sclera: Conjunctivae normal.      Pupils: Pupils are equal, round, and reactive to light.   Neck:      Vascular: No carotid bruit, hepatojugular reflux or JVD.      Trachea: No tracheal deviation.      Comments: Nl. Carotid upstrokes  Cardiovascular:      Rate and Rhythm: Normal rate and regular rhythm.      Pulses:           Radial pulses are 2+ on the right side and 2+ on the left side.      Heart sounds: S1 normal and S2 normal. Murmur heard.      Systolic murmur is present.      No friction rub. S4 sounds present. No S3 sounds.      Comments: 1/6 systolic ejection murmur RUSB    Pulmonary:      Effort: Pulmonary effort is " normal.      Breath sounds: Normal breath sounds. No wheezing, rhonchi or rales.      Comments: Nl. Expir. Phase  Nl. Breath sound intensity  Dry crackles left base  Abdominal:      General: Bowel sounds are normal. There is no distension or abdominal bruit.      Palpations: Abdomen is soft. There is no mass.      Tenderness: There is no abdominal tenderness. There is no guarding or rebound.      Comments: No organomegaly   Musculoskeletal:         General: No tenderness or deformity. Normal range of motion.      Cervical back: Normal range of motion and neck supple.      Right lower leg: Edema present.      Left lower leg: Edema present.      Comments: BLE, trace edema, palpable pedal pulses     Skin:     General: Skin is warm and dry.      Coloration: Skin is not pale.      Findings: No erythema or rash.   Neurological:      Mental Status: She is alert and oriented to person, place, and time.   Psychiatric:         Behavior: Behavior normal.         Thought Content: Thought content normal.         Judgment: Judgment normal.           ECG 12 Lead    Date/Time: 9/4/2024 11:09 AM  Performed by: Jameson Franks MD    Authorized by: Jameson Franks MD  Comparison: compared with previous ECG from 8/30/2023            Assessment & Plan   #1.  Nonobstructive carotid disease.  Ms. miguel has had no recurrent TIA or stroke on current medical therapy.  Risk factors are being appropriately addressed.  No changes are indicated.  Ms. miguel had no atrial fibrillation on prior event monitoring.    2.  Treated and controlled systemic hypertension.    3.  Treated and controlled dyslipidemia.    4.  Non-insulin-requiring diabetes.  Hemoglobin A1c is at goal on therapy directed by Dr. Nye.    5.  Left anterior fascicular block on EKG.  This is unchanged from prior.  The patient has no symptoms of conduction system disease.    6.  The patient will follow with Dr. Nye as instructed we will plan to continue yearly follow-ups  with an understanding that the patient will contact us for any   Diagnosis Plan   1. Bilateral carotid artery stenosis        2. Essential hypertension        3. Mixed hyperlipidemia        4. Systolic ejection murmur        5. History of TIA (transient ischemic attack)        6. Shortness of breath        7. Syncope, unspecified syncope type            No follow-ups on file.         Deborah Corrales  reports that she has never smoked. She has never used smokeless tobacco. I have educated her on the risk of diseases from using tobacco products such as cancer, COPD, and heart disease.     Advance Care Planning   ACP discussion was held with the patient during this visit. Patient does not have an advance directive, declines further assistance.              BMI is >= 25 and <30. (Overweight) The following options were offered after discussion;: pcp addressing               Electronically signed by:    Scribed for Jameson Franks MD by Debbie Fabian LPN on September 4, 2024  at 11:09 EDT    I, Jameson Franks MD personally performed the services described in this documentation as scribed by the above named individual in my presence, and it is both accurate and complete. September 4, 2024 11:09 EDT      Dictated Utilizing Dragon Dictation: Part of this note may be an electronic transcription/translation of spoken language to printed text using the Dragon Dictation System.

## 2024-09-23 LAB
MAXIMAL PREDICTED HEART RATE: 147 BPM
STRESS TARGET HR: 125 BPM

## 2024-10-21 DIAGNOSIS — I10 ESSENTIAL HYPERTENSION: ICD-10-CM

## 2024-10-21 RX ORDER — AMLODIPINE BESYLATE 2.5 MG/1
TABLET ORAL
Qty: 90 TABLET | Refills: 3 | Status: SHIPPED | OUTPATIENT
Start: 2024-10-21

## 2024-10-21 RX ORDER — LISINOPRIL 10 MG/1
TABLET ORAL
Qty: 90 TABLET | Refills: 3 | Status: SHIPPED | OUTPATIENT
Start: 2024-10-21

## 2024-10-21 NOTE — TELEPHONE ENCOUNTER
Name from pharmacy: amLODIPine Besylate Oral Tablet 2.5 MG         Will file in chart as: amLODIPine (NORVASC) 2.5 MG tablet    Sig: TAKE 1 TABLET EVERY DAY    Disp: 90 tablet    Refills: 3    Start: 10/21/2024    Class: Normal    Non-formulary For: Essential hypertension    Last ordered: 9 months ago (1/4/2024) by Jameson Franks MD    Last refill: 8/8/2024    Rx #: [id number]    Calcium-Channel Blockers Protocol Ofdiln91/21/2024 03:40 PM   Protocol Details Normal serum potassium in past 12 months    GFR> 30 ml/min in past 12 months    No active pregnancy on record    No positive pregnancy test in past 12 months    BP under 130/80 in past year and patient has diabetes, CAD or PVD    Recent or future visit with authorizing provider       Name from pharmacy: Lisinopril Oral Tablet 10 MG         Will file in chart as: lisinopril (PRINIVIL,ZESTRIL) 10 MG tablet    Sig: TAKE 1 TABLET EVERY DAY    Disp: 90 tablet    Refills: 3    Start: 10/21/2024    Class: Normal    Non-formulary For: Essential hypertension    Last ordered: 9 months ago (1/4/2024) by Jameson Franks MD    Last refill: 8/8/2024    Rx #: [id number]    ACE Inhibitors Protocol Bvtboe33/21/2024 03:40 PM   Protocol Details Normal serum potassium in past 12 months    Most recent eGFR is above 30 in the past 12 months.    No active pregnancy on record    Blood pressure on record    No positive pregnancy test in past 12 months    Patient is not on Entresto    Patient is not on an ARB    Recent or future visit with authorizing provider

## 2025-03-14 ENCOUNTER — TELEPHONE (OUTPATIENT)
Dept: CARDIOLOGY | Facility: CLINIC | Age: 80
End: 2025-03-14

## 2025-03-14 NOTE — TELEPHONE ENCOUNTER
Caller: Deborah Corrales    Relationship to patient: Self    Best call back number: [phone]     Chief complaint:   PT FEELING VERY UNBALANCED. PASSED OUT A MONTH AGO. FEELING SHAKY AND BP IS FLUCTUATING. FEELING VERY DIZZY AS WELL. SAW DR. LIU ON 3.12.25, PERFORMED ECG AND LABS, CAME BACK OKAY.     Type of visit: FOLLOW UP     Requested date: ASAP     If rescheduling, when is the original appointment:  9.8.25 FOR YEARLY    Additional notes: NA

## 2025-03-14 NOTE — TELEPHONE ENCOUNTER
Patient reports her blood pressure was 126/61, patient states the day she passed out it was 85/55. Patient has no chest pain or palpitations. Patient has had dizziness no headaches. Patient would like an appointment to be evaluated.

## 2025-03-27 ENCOUNTER — OFFICE VISIT (OUTPATIENT)
Dept: CARDIOLOGY | Facility: CLINIC | Age: 80
End: 2025-03-27
Payer: MEDICARE

## 2025-03-27 VITALS
HEART RATE: 58 BPM | OXYGEN SATURATION: 99 % | WEIGHT: 169.8 LBS | HEIGHT: 66 IN | SYSTOLIC BLOOD PRESSURE: 127 MMHG | BODY MASS INDEX: 27.29 KG/M2 | DIASTOLIC BLOOD PRESSURE: 57 MMHG

## 2025-03-27 DIAGNOSIS — R06.02 SHORTNESS OF BREATH: ICD-10-CM

## 2025-03-27 DIAGNOSIS — R42 DIZZINESS: ICD-10-CM

## 2025-03-27 DIAGNOSIS — I65.23 BILATERAL CAROTID ARTERY STENOSIS: ICD-10-CM

## 2025-03-27 DIAGNOSIS — R55 SYNCOPE, UNSPECIFIED SYNCOPE TYPE: Primary | ICD-10-CM

## 2025-03-27 PROCEDURE — 3078F DIAST BP <80 MM HG: CPT | Performed by: CLINICAL NURSE SPECIALIST

## 2025-03-27 PROCEDURE — 3074F SYST BP LT 130 MM HG: CPT | Performed by: CLINICAL NURSE SPECIALIST

## 2025-03-27 PROCEDURE — 99214 OFFICE O/P EST MOD 30 MIN: CPT | Performed by: CLINICAL NURSE SPECIALIST

## 2025-03-27 NOTE — PROGRESS NOTES
Subjective     Deborah Corrales is a 79 y.o. female who presents today for Follow-up (F/U (Dr. Franks) ).    CHIEF COMPLIANT  Chief Complaint   Patient presents with    Follow-up     F/U (Dr. Franks)        Active Problems:  1. Fatigue   2. Hyperlipidemia   3. Hypertension   4. Thyroid disease   5. H/o TIA  6. Diabetes   7. Family H/o CAD   8. Carotid artery stenosis   8.1 carotid duplex 05/02/17 Potentially hemodynamically significant disease in the proximal right internal carotid artery.   8.2 CTA neck 06/02/17 stenosis HECTOR approximal  50%, stenosis LICA less than 40%. No flow identified in right vertebral artery, left vertebral artery is patent and extends to fill the basilar artery.  9. Event monitor, 8- - 9-. abdoulaye JAMA at 46, PVC'S    HPI  The patient is an 79-year-old female that returns for follow-up.  She was recently at an exercise program where she does michell chi and had a syncopal event.  The patient states that she does sit down during this class.  She was sitting in the chair and she states the light started getting them in the next thing she knew she had passed out in the floor.  Friends in the class states that she was only passed out for a very brief time, just a few seconds.  BP was low when it was checked, 85/55.  She denies significant chest pain, worsening dyspnea or palpitations.  She states she does have a history of low heart rate.  The patient has had no further syncope since that time but has had some dizziness.  The patient does not recall having any symptoms prior to the syncopal event.  She did have an EKG on March 12 which showed normal sinus rhythm with LAFB, lateral infarct, IVCD.  In comparison to her prior EKG there is no significant changes.  Heart rate and blood pressure stable today.  The patient had not been sick prior to this event.  She states she eats well but does not drink enough fluids.  Prior to passing out she states she had not done any maneuvers which  required her to bend over.    PRIOR MEDS  Current Outpatient Medications on File Prior to Visit   Medication Sig Dispense Refill    amLODIPine (NORVASC) 2.5 MG tablet TAKE 1 TABLET EVERY DAY 90 tablet 3    aspirin 325 MG tablet Take 1 tablet by mouth Daily. (Patient taking differently: Take 1 tablet by mouth Daily. Pt takes PRN)      cholecalciferol (VITAMIN D3) 25 MCG (1000 UT) tablet Take 1 tablet by mouth Daily.      dorzolamide-timolol (COSOPT) 22.3-6.8 MG/ML ophthalmic solution Administer 1 drop to the right eye 2 (two) times a day.      estradiol (ESTRACE) 0.1 MG/GM vaginal cream 3 x weekly      glimepiride (AMARYL) 4 MG tablet Daily.      levothyroxine (SYNTHROID, LEVOTHROID) 75 MCG tablet Take 1 tablet by mouth Daily.      lisinopril (PRINIVIL,ZESTRIL) 10 MG tablet TAKE 1 TABLET EVERY DAY 90 tablet 3    Multiple Vitamins-Minerals (PRESERVISION AREDS 2 PO) Take  by mouth Daily.      omeprazole (priLOSEC) 40 MG capsule Take 1 capsule by mouth Daily.      pravastatin (PRAVACHOL) 20 MG tablet Take 1 tablet by mouth Daily.      celecoxib (CeleBREX) 200 MG capsule Take 1 capsule by mouth Daily. (Patient not taking: Reported on 3/27/2025)       No current facility-administered medications on file prior to visit.       ALLERGIES  Patient has no known allergies.    HISTORY  Past Medical History:   Diagnosis Date    Cancer     Diabetes mellitus     GERD (gastroesophageal reflux disease)     Hyperlipidemia     Hypertension     Hypothyroidism     Non Hodgkin's lymphoma     Non Hodgkin's lymphoma     Rupture of bowel     2014     TIA (transient ischemic attack)        Social History     Socioeconomic History    Marital status: Single   Tobacco Use    Smoking status: Never    Smokeless tobacco: Never   Substance and Sexual Activity    Alcohol use: No    Drug use: No    Sexual activity: Defer       Family History   Problem Relation Age of Onset    Heart disease Mother     COPD Mother     Pancreatic cancer Father     Heart  "disease Maternal Uncle        Review of Systems   Constitutional:  Positive for fatigue (Sometimes, but not regularly).   Eyes:  Positive for visual disturbance (Room got dim prior to syncopal episode).   Respiratory:  Negative for chest tightness and shortness of breath.         States sometimes she feels that she's not getting a good breath, but denies SoB or tightness     Cardiovascular:  Negative for chest pain, palpitations and leg swelling.   Musculoskeletal:  Positive for arthralgias and neck pain.        States she thinks a lot of issues come from her neck, states she has spurs and arthritis in her neck. States sometimes when her neck is stiff, it makes her feel like she will pass out. States she has an upcoming appt for this.    Neurological:  Positive for syncope (2/14/25 saw PCP. Stated she passed out at her michell chi class, room got dim, and she passed out. BP was 85/55 when checked after that.) and numbness (Fingers). Negative for dizziness, weakness and headaches.   Psychiatric/Behavioral:  Negative for sleep disturbance.        Objective     VITALS: /57 (BP Location: Left arm)   Pulse 58   Ht 167.6 cm (66\")   Wt 77 kg (169 lb 12.8 oz)   SpO2 99%   BMI 27.41 kg/m²     LABS:   Lab Results (most recent)       None            IMAGING:   No Images in the past 120 days found..    EXAM:  Physical Exam  Constitutional:       Appearance: Normal appearance.   Eyes:      Pupils: Pupils are equal, round, and reactive to light.   Cardiovascular:      Rate and Rhythm: Normal rate and regular rhythm.      Pulses:           Carotid pulses are 2+ on the right side and 2+ on the left side.       Radial pulses are 2+ on the right side and 2+ on the left side.        Dorsalis pedis pulses are 2+ on the right side and 2+ on the left side.        Posterior tibial pulses are 2+ on the right side and 2+ on the left side.      Heart sounds: Murmur heard.       with a grade of 1/6.   Pulmonary:      Effort: Pulmonary " effort is normal.      Breath sounds: Normal breath sounds.   Abdominal:      General: Bowel sounds are normal.      Palpations: Abdomen is soft.   Musculoskeletal:      Right lower leg: No edema.      Left lower leg: No edema.   Skin:     General: Skin is warm and dry.      Capillary Refill: Capillary refill takes less than 2 seconds.   Neurological:      General: No focal deficit present.      Mental Status: She is alert and oriented to person, place, and time.   Psychiatric:         Mood and Affect: Mood normal.         Thought Content: Thought content normal.         Procedure   Procedures       Assessment & Plan    Diagnosis Plan   1. Syncope, unspecified syncope type  Stress Test With Myocardial Perfusion One Day    Adult Transthoracic Echo Complete W/ Cont if Necessary Per Protocol    Holter Monitor - 72 Hour Up To 15 Days    Duplex Carotid Ultrasound CAR      2. Shortness of breath  Stress Test With Myocardial Perfusion One Day    Adult Transthoracic Echo Complete W/ Cont if Necessary Per Protocol      3. Dizziness  Stress Test With Myocardial Perfusion One Day    Adult Transthoracic Echo Complete W/ Cont if Necessary Per Protocol    Holter Monitor - 72 Hour Up To 15 Days    Duplex Carotid Ultrasound CAR      4. Bilateral carotid artery stenosis  Duplex Carotid Ultrasound CAR        Plan:  1.  Syncope: We will place the patient on a 2-week Holter monitor to evaluate for possible etiology of syncope.  She does have a known LAFB and would be at increased risk for further development of conduction system disease.  Will schedule a carotid Doppler to reevaluate for worsening carotid stenosis.  The patient has known nonobstructive disease but this has not been checked since 2017.  Will schedule a stress test for ischemic evaluation and an echocardiogram to evaluate LV function and structural anatomy.  She does have a mild systolic ejection murmur on exam, this is not a new finding.  2.  Shortness of breath: We  will proceed with nuclear stress and echocardiogram as discussed above.  3.  Dizziness: Plan as above.  4.  Bilateral carotid artery disease: Proceed with carotid Doppler as discussed above.    Return in about 3 months (around 6/27/2025).    Deborah Corrales  reports that she has never smoked. She has never used smokeless tobacco.        Advance Care Planning  ACP discussion was held with the patient during this visit. Patient does not have an advance directive, declines further assistance.       MEDS ORDERED DURING VISIT:  No orders of the defined types were placed in this encounter.      DISCONTINUED MEDS DURING VISIT:   There are no discontinued medications.       This document has been electronically signed by CONY Herron  March 27, 2025 11:31 EDT    Dictated Utilizing Dragon Dictation: Part of this note may be an electronic transcription/translation of spoken language to printed text using the Dragon Dictation System

## 2025-05-06 ENCOUNTER — HOSPITAL ENCOUNTER (OUTPATIENT)
Dept: CARDIOLOGY | Facility: HOSPITAL | Age: 80
Discharge: HOME | End: 2025-05-06
Payer: MEDICARE

## 2025-05-06 DIAGNOSIS — R06.02 SHORTNESS OF BREATH: ICD-10-CM

## 2025-05-06 DIAGNOSIS — R55 SYNCOPE, UNSPECIFIED SYNCOPE TYPE: ICD-10-CM

## 2025-05-06 DIAGNOSIS — I65.23 BILATERAL CAROTID ARTERY STENOSIS: ICD-10-CM

## 2025-05-06 DIAGNOSIS — R42 DIZZINESS: ICD-10-CM

## 2025-05-06 LAB
AORTIC DIMENSIONLESS INDEX: 0.66 (DI)
AV MEAN PRESS GRAD SYS DOP V1V2: 3 MMHG
AV VMAX SYS DOP: 123 CM/SEC
BH CV ECHO MEAS - ACS: 1.95 CM
BH CV ECHO MEAS - AI P1/2T: 714.8 MSEC
BH CV ECHO MEAS - AO MAX PG: 6.1 MMHG
BH CV ECHO MEAS - AO ROOT DIAM: 3.4 CM
BH CV ECHO MEAS - AO V2 VTI: 30.8 CM
BH CV ECHO MEAS - AVA(I,D): 2.29 CM2
BH CV ECHO MEAS - EDV(CUBED): 115.5 ML
BH CV ECHO MEAS - EDV(MOD-SP4): 73.9 ML
BH CV ECHO MEAS - EF(MOD-SP4): 52.8 %
BH CV ECHO MEAS - ESV(CUBED): 35 ML
BH CV ECHO MEAS - ESV(MOD-SP4): 34.9 ML
BH CV ECHO MEAS - FS: 32.9 %
BH CV ECHO MEAS - IVS/LVPW: 0.73 CM
BH CV ECHO MEAS - IVSD: 0.83 CM
BH CV ECHO MEAS - LA A2CS (ATRIAL LENGTH): 5.8 CM
BH CV ECHO MEAS - LA A4C LENGTH: 5.5 CM
BH CV ECHO MEAS - LA DIMENSION: 4.1 CM
BH CV ECHO MEAS - LAT PEAK E' VEL: 7.4 CM/SEC
BH CV ECHO MEAS - LV DIASTOLIC VOL/BSA (35-75): 39.7 CM2
BH CV ECHO MEAS - LV MASS(C)D: 169.4 GRAMS
BH CV ECHO MEAS - LV MAX PG: 2.6 MMHG
BH CV ECHO MEAS - LV MEAN PG: 1 MMHG
BH CV ECHO MEAS - LV SYSTOLIC VOL/BSA (12-30): 18.7 CM2
BH CV ECHO MEAS - LV V1 MAX: 80.6 CM/SEC
BH CV ECHO MEAS - LV V1 VTI: 20.4 CM
BH CV ECHO MEAS - LVIDD: 4.9 CM
BH CV ECHO MEAS - LVIDS: 3.3 CM
BH CV ECHO MEAS - LVOT AREA: 3.5 CM2
BH CV ECHO MEAS - LVOT DIAM: 2.1 CM
BH CV ECHO MEAS - LVPWD: 1.13 CM
BH CV ECHO MEAS - MED PEAK E' VEL: 5.7 CM/SEC
BH CV ECHO MEAS - MV A MAX VEL: 99.2 CM/SEC
BH CV ECHO MEAS - MV DEC SLOPE: 328 CM/SEC2
BH CV ECHO MEAS - MV DEC TIME: 0.28 SEC
BH CV ECHO MEAS - MV E MAX VEL: 53.7 CM/SEC
BH CV ECHO MEAS - MV E/A: 0.54
BH CV ECHO MEAS - MV MAX PG: 5.5 MMHG
BH CV ECHO MEAS - MV MEAN PG: 1 MMHG
BH CV ECHO MEAS - MV P1/2T: 64.2 MSEC
BH CV ECHO MEAS - MV V2 VTI: 31.4 CM
BH CV ECHO MEAS - MVA(P1/2T): 3.4 CM2
BH CV ECHO MEAS - MVA(VTI): 2.25 CM2
BH CV ECHO MEAS - PA V2 MAX: 85.6 CM/SEC
BH CV ECHO MEAS - PI END-D VEL: 135 CM/SEC
BH CV ECHO MEAS - RAP SYSTOLE: 10 MMHG
BH CV ECHO MEAS - RV MAX PG: 1.25 MMHG
BH CV ECHO MEAS - RV V1 MAX: 55.9 CM/SEC
BH CV ECHO MEAS - RV V1 VTI: 16.5 CM
BH CV ECHO MEAS - RVDD: 3.3 CM
BH CV ECHO MEAS - RVSP: 43.9 MMHG
BH CV ECHO MEAS - SV(LVOT): 70.7 ML
BH CV ECHO MEAS - SV(MOD-SP4): 39 ML
BH CV ECHO MEAS - SVI(LVOT): 37.9 ML/M2
BH CV ECHO MEAS - SVI(MOD-SP4): 20.9 ML/M2
BH CV ECHO MEAS - TR MAX PG: 33.9 MMHG
BH CV ECHO MEAS - TR MAX VEL: 291 CM/SEC
BH CV ECHO MEASUREMENTS AVERAGE E/E' RATIO: 8.2
BH CV XLRA - RV BASE: 2.9 CM
BH CV XLRA - RV LENGTH: 6.5 CM
BH CV XLRA - RV MID: 2.12 CM
BH CV XLRA - TDI S': 9.4 CM/SEC
BH CV XLRA MEAS LEFT CAROTID BULB EDV: 7.7 CM/SEC
BH CV XLRA MEAS LEFT CAROTID BULB PSV: 62.3 CM/SEC
BH CV XLRA MEAS LEFT DIST CCA EDV: 14 CM/SEC
BH CV XLRA MEAS LEFT DIST CCA PSV: 73 CM/SEC
BH CV XLRA MEAS LEFT DIST ICA EDV: -20.4 CM/SEC
BH CV XLRA MEAS LEFT DIST ICA PSV: -99.6 CM/SEC
BH CV XLRA MEAS LEFT ICA/CCA RATIO: 1.6
BH CV XLRA MEAS LEFT MID ICA EDV: 25.9 CM/SEC
BH CV XLRA MEAS LEFT MID ICA PSV: 120 CM/SEC
BH CV XLRA MEAS LEFT PROX CCA EDV: 16.9 CM/SEC
BH CV XLRA MEAS LEFT PROX CCA PSV: 74.4 CM/SEC
BH CV XLRA MEAS LEFT PROX ECA PSV: -81.4 CM/SEC
BH CV XLRA MEAS LEFT PROX ICA EDV: -21.2 CM/SEC
BH CV XLRA MEAS LEFT PROX ICA PSV: -103 CM/SEC
BH CV XLRA MEAS LEFT VERTEBRAL A EDV: -21.2 CM/SEC
BH CV XLRA MEAS LEFT VERTEBRAL A PSV: -69.8 CM/SEC
BH CV XLRA MEAS RIGHT CAROTID BULB EDV: 13.7 CM/SEC
BH CV XLRA MEAS RIGHT CAROTID BULB PSV: 81.4 CM/SEC
BH CV XLRA MEAS RIGHT DIST CCA EDV: 11.8 CM/SEC
BH CV XLRA MEAS RIGHT DIST CCA PSV: 72.7 CM/SEC
BH CV XLRA MEAS RIGHT DIST ICA EDV: -21.6 CM/SEC
BH CV XLRA MEAS RIGHT DIST ICA PSV: -97.7 CM/SEC
BH CV XLRA MEAS RIGHT ICA/CCA RATIO: 2.4
BH CV XLRA MEAS RIGHT MID ICA EDV: 17.6 CM/SEC
BH CV XLRA MEAS RIGHT MID ICA PSV: 177 CM/SEC
BH CV XLRA MEAS RIGHT PROX CCA EDV: 10.6 CM/SEC
BH CV XLRA MEAS RIGHT PROX CCA PSV: 64.6 CM/SEC
BH CV XLRA MEAS RIGHT PROX ECA PSV: -75.2 CM/SEC
BH CV XLRA MEAS RIGHT PROX ICA EDV: -32.9 CM/SEC
BH CV XLRA MEAS RIGHT PROX ICA PSV: -162 CM/SEC
BH CV XLRA MEAS RIGHT VERTEBRAL A EDV: 7.3 CM/SEC
BH CV XLRA MEAS RIGHT VERTEBRAL A PSV: 41.8 CM/SEC
LEFT ATRIUM VOLUME INDEX: 22.8 ML/M2
LEFT ATRIUM VOLUME: 42 ML
LV EF 3D SEGMENTATION: 62 %

## 2025-05-06 PROCEDURE — 93306 TTE W/DOPPLER COMPLETE: CPT

## 2025-05-06 PROCEDURE — 93880 EXTRACRANIAL BILAT STUDY: CPT

## 2025-05-06 PROCEDURE — 93017 CV STRESS TEST TRACING ONLY: CPT

## 2025-05-06 PROCEDURE — A9500 TC99M SESTAMIBI: HCPCS | Performed by: INTERNAL MEDICINE

## 2025-05-06 PROCEDURE — 78452 HT MUSCLE IMAGE SPECT MULT: CPT

## 2025-05-06 PROCEDURE — 25010000002 REGADENOSON 0.4 MG/5ML SOLUTION: Performed by: INTERNAL MEDICINE

## 2025-05-06 PROCEDURE — 34310000005 TECHNETIUM SESTAMIBI: Performed by: INTERNAL MEDICINE

## 2025-05-06 RX ORDER — REGADENOSON 0.08 MG/ML
0.4 INJECTION, SOLUTION INTRAVENOUS
Status: COMPLETED | OUTPATIENT
Start: 2025-05-06 | End: 2025-05-06

## 2025-05-06 RX ADMIN — REGADENOSON 0.4 MG: 0.08 INJECTION, SOLUTION INTRAVENOUS at 11:40

## 2025-05-06 RX ADMIN — TECHNETIUM TC 99M SESTAMIBI 1 DOSE: 1 INJECTION INTRAVENOUS at 10:04

## 2025-05-06 RX ADMIN — TECHNETIUM TC 99M SESTAMIBI 1 DOSE: 1 INJECTION INTRAVENOUS at 11:41

## 2025-05-11 LAB
BH CV REST NUCLEAR ISOTOPE DOSE: 10 MCI
BH CV STRESS COMMENTS STAGE 1: NORMAL
BH CV STRESS DOSE REGADENOSON STAGE 1: 0.4
BH CV STRESS DURATION MIN STAGE 1: 0
BH CV STRESS DURATION SEC STAGE 1: 10
BH CV STRESS NUCLEAR ISOTOPE DOSE: 30 MCI
BH CV STRESS PROTOCOL 1: NORMAL
BH CV STRESS RECOVERY BP: NORMAL MMHG
BH CV STRESS RECOVERY HR: 68 BPM
BH CV STRESS STAGE 1: 1
MAXIMAL PREDICTED HEART RATE: 139 BPM
PERCENT MAX PREDICTED HR: 51.8 %
STRESS BASELINE BP: NORMAL MMHG
STRESS BASELINE HR: 59 BPM
STRESS PERCENT HR: 61 %
STRESS POST ESTIMATED WORKLOAD: 1 METS
STRESS POST EXERCISE DUR MIN: 2 MIN
STRESS POST PEAK BP: NORMAL MMHG
STRESS POST PEAK HR: 72 BPM
STRESS TARGET HR: 118 BPM

## 2025-05-12 ENCOUNTER — RESULTS FOLLOW-UP (OUTPATIENT)
Dept: CARDIOLOGY | Facility: CLINIC | Age: 80
End: 2025-05-12
Payer: MEDICARE

## 2025-05-12 DIAGNOSIS — I65.29 STENOSIS OF CAROTID ARTERY, UNSPECIFIED LATERALITY: Primary | ICD-10-CM

## 2025-05-12 NOTE — PROGRESS NOTES
No evidence of ischemia.  EF 49%.  Echo pending.  Will determine follow up time frame once echo is reviewed.

## 2025-05-12 NOTE — TELEPHONE ENCOUNTER
Dawna Sánchez APRN to Me (Selected Message)      5/12/25  8:12 AM  Note      No evidence of ischemia.  EF 49%.  Echo pending.  Will determine follow up time frame once echo is reviewed.          Stress Test With Myocardial Perfusion One Day

## 2025-05-12 NOTE — TELEPHONE ENCOUNTER
Called and informed pt of results and that I would call back when echo results are available, she verbalized understanding.

## 2025-05-16 NOTE — TELEPHONE ENCOUNTER
Called and informed pt of the message from anne, she verbalized understanding and agreed to additional testing.

## 2025-05-16 NOTE — TELEPHONE ENCOUNTER
Dawna Sánchez, APRN to Me      5/16/25  7:44 AM  Note      Can not rule out significant stenosis in the right ICA.  The patient needs a carotid CTA.           Duplex Carotid Ultrasound CAR

## 2025-05-16 NOTE — TELEPHONE ENCOUNTER
Dawna Sánchez APRN to Me (Selected Message)      5/16/25  7:46 AM  Note      EF 50-55%.  Mild MR.  Moderate AI.  Will discuss further at her follow up.           Adult Transthoracic Echo Complete W/ Cont if Necessary Per Protocol

## 2025-05-19 DIAGNOSIS — R06.02 SHORTNESS OF BREATH: Primary | ICD-10-CM

## 2025-05-19 DIAGNOSIS — I65.23 BILATERAL CAROTID ARTERY STENOSIS: ICD-10-CM

## 2025-05-19 DIAGNOSIS — I65.29 STENOSIS OF CAROTID ARTERY, UNSPECIFIED LATERALITY: ICD-10-CM

## 2025-05-23 ENCOUNTER — RESULTS FOLLOW-UP (OUTPATIENT)
Dept: CARDIOLOGY | Facility: CLINIC | Age: 80
End: 2025-05-23
Payer: MEDICARE

## 2025-05-23 DIAGNOSIS — I65.21 CAROTID ARTERY STENOSIS, SYMPTOMATIC, RIGHT: Primary | ICD-10-CM

## 2025-05-23 NOTE — TELEPHONE ENCOUNTER
----- Message from Dawna Sánchez sent at 5/22/2025  5:02 PM EDT -----  Greater than 70% right carotid stenosis identified on CTA.  She needs referral to Dr Austin for further evaluation and treatment of this.  There was also a potential area (hypodensity left side of   brain) seen in the brain and dedicated imaging was recommended.  She needs an MRI of the brain.  Please forward findings to her PCP.    ----- Message -----  From: Joleen Aguero RegSched Rep  Sent: 5/22/2025   4:43 PM EDT  To: Dawna Sánchez, CONY

## 2025-05-23 NOTE — TELEPHONE ENCOUNTER
Called and informed pt of results and referral, she verbalized understanding. She is aware that results were sent to PCP regd brain

## 2025-06-09 ENCOUNTER — OFFICE VISIT (OUTPATIENT)
Dept: NEUROSURGERY | Facility: CLINIC | Age: 80
End: 2025-06-09
Payer: MEDICARE

## 2025-06-09 VITALS
HEIGHT: 65 IN | WEIGHT: 169.4 LBS | HEART RATE: 60 BPM | SYSTOLIC BLOOD PRESSURE: 160 MMHG | OXYGEN SATURATION: 97 % | TEMPERATURE: 97 F | BODY MASS INDEX: 28.22 KG/M2 | DIASTOLIC BLOOD PRESSURE: 70 MMHG

## 2025-06-09 DIAGNOSIS — I65.21 CAROTID STENOSIS, SYMPTOMATIC W/O INFARCT, RIGHT: Primary | ICD-10-CM

## 2025-06-09 PROCEDURE — 99204 OFFICE O/P NEW MOD 45 MIN: CPT | Performed by: RADIOLOGY

## 2025-06-09 PROCEDURE — 1160F RVW MEDS BY RX/DR IN RCRD: CPT | Performed by: RADIOLOGY

## 2025-06-09 PROCEDURE — 3078F DIAST BP <80 MM HG: CPT | Performed by: RADIOLOGY

## 2025-06-09 PROCEDURE — 1159F MED LIST DOCD IN RCRD: CPT | Performed by: RADIOLOGY

## 2025-06-09 PROCEDURE — 3077F SYST BP >= 140 MM HG: CPT | Performed by: RADIOLOGY

## 2025-06-09 RX ORDER — NITROFURANTOIN 25; 75 MG/1; MG/1
1 CAPSULE ORAL 2 TIMES DAILY
COMMUNITY

## 2025-06-09 RX ORDER — CALCIUM CARBONATE/VITAMIN D3 600MG-5MCG
1 TABLET ORAL EVERY 12 HOURS SCHEDULED
COMMUNITY
Start: 2025-04-22

## 2025-06-09 RX ORDER — FLUCONAZOLE 100 MG/1
TABLET ORAL
COMMUNITY

## 2025-06-09 NOTE — PROGRESS NOTES
NAME: ELYSIA LOBO   DOS: 2025  : 1945  PCP: Marisa Nye MD    Chief Complaint:    Chief Complaint   Patient presents with    stenosis     Carotid artery stenosis       History of Present Illness:  79 y.o. female who experienced a syncopal episode, and as part of her workup/evaluation, she has undergone noninvasive imaging studies demonstrating indeterminate severity right carotid stenosis.  She denies any prior stroke or TIA-like symptoms.  She currently on an aspirin/statin regimen.  She is a non-smoker.  I am seeing her in consultation regarding her asymptomatic carotid occlusive disease.    Past Medical History:  Past Medical History:   Diagnosis Date    Cancer     Diabetes mellitus     GERD (gastroesophageal reflux disease)     Hyperlipidemia     Hypertension     Hypothyroidism     Non Hodgkin's lymphoma     Non Hodgkin's lymphoma     Rupture of bowel          TIA (transient ischemic attack)        Past Surgical History:  Past Surgical History:   Procedure Laterality Date    CATARACT EXTRACTION      CHOLECYSTECTOMY OPEN      EYE SURGERY      TUMOR REMOVAL      under jaw       Review of Systems:        Review of Systems   Constitutional:  Negative for activity change, appetite change, chills, diaphoresis, fatigue, fever and unexpected weight change.   HENT:  Negative for congestion, dental problem, drooling, ear discharge, ear pain, facial swelling, hearing loss, mouth sores, nosebleeds, postnasal drip, rhinorrhea, sinus pressure, sinus pain, sneezing, sore throat, tinnitus, trouble swallowing and voice change.    Eyes:  Negative for photophobia, pain, discharge, redness, itching and visual disturbance.   Respiratory:  Negative for apnea, cough, choking, chest tightness, shortness of breath, wheezing and stridor.    Cardiovascular:  Negative for chest pain, palpitations and leg swelling.   Gastrointestinal:  Negative for abdominal distention, abdominal pain, anal bleeding, blood in stool,  constipation, diarrhea, nausea, rectal pain and vomiting.   Endocrine: Negative for cold intolerance, heat intolerance, polydipsia, polyphagia and polyuria.   Genitourinary:  Negative for decreased urine volume, difficulty urinating, dysuria, enuresis, flank pain, frequency, genital sores, hematuria and urgency.   Musculoskeletal:  Negative for arthralgias, back pain, gait problem, joint swelling, myalgias, neck pain and neck stiffness.   Skin:  Negative for color change, pallor, rash and wound.   Allergic/Immunologic: Negative for environmental allergies, food allergies and immunocompromised state.   Neurological:  Negative for dizziness, tremors, seizures, syncope, facial asymmetry, speech difficulty, weakness, light-headedness, numbness and headaches.   Hematological:  Negative for adenopathy. Does not bruise/bleed easily.   Psychiatric/Behavioral:  Negative for agitation, behavioral problems, confusion, decreased concentration, dysphoric mood, hallucinations, self-injury, sleep disturbance and suicidal ideas. The patient is not nervous/anxious and is not hyperactive.    All other systems reviewed and are negative.       Medications    Current Outpatient Medications:     amLODIPine (NORVASC) 2.5 MG tablet, TAKE 1 TABLET EVERY DAY, Disp: 90 tablet, Rfl: 3    amoxicillin-clavulanate (AUGMENTIN) 875-125 MG per tablet, , Disp: , Rfl:     aspirin 325 MG tablet, Take 1 tablet by mouth Daily. (Patient taking differently: Take 1 tablet by mouth Daily. Pt takes PRN), Disp: , Rfl:     Calcium + Vitamin D3 600-5 MG-MCG tablet, Take 1 tablet by mouth Every 12 (Twelve) Hours., Disp: , Rfl:     estradiol (ESTRACE) 0.1 MG/GM vaginal cream, 3 x weekly, Disp: , Rfl:     fluconazole (DIFLUCAN) 100 MG tablet, TAKE 1 TABLET BY MOUTH ONCE DAILY FOR yeast, Disp: , Rfl:     glimepiride (AMARYL) 4 MG tablet, Daily., Disp: , Rfl:     levothyroxine (SYNTHROID, LEVOTHROID) 75 MCG tablet, Take 1 tablet by mouth Daily., Disp: , Rfl:      lisinopril (PRINIVIL,ZESTRIL) 10 MG tablet, TAKE 1 TABLET EVERY DAY, Disp: 90 tablet, Rfl: 3    Multiple Vitamins-Minerals (PRESERVISION AREDS 2 PO), Take  by mouth Daily., Disp: , Rfl:     nitrofurantoin, macrocrystal-monohydrate, (MACROBID) 100 MG capsule, Take 1 capsule by mouth 2 (Two) Times a Day., Disp: , Rfl:     omeprazole (priLOSEC) 40 MG capsule, Take 1 capsule by mouth Daily., Disp: , Rfl:     pravastatin (PRAVACHOL) 20 MG tablet, Take 1 tablet by mouth Daily., Disp: , Rfl:     celecoxib (CeleBREX) 200 MG capsule, Take 1 capsule by mouth Daily. (Patient not taking: Reported on 6/9/2025), Disp: , Rfl:     cholecalciferol (VITAMIN D3) 25 MCG (1000 UT) tablet, Take 1 tablet by mouth Daily. (Patient not taking: Reported on 6/9/2025), Disp: , Rfl:     dorzolamide-timolol (COSOPT) 22.3-6.8 MG/ML ophthalmic solution, Administer 1 drop to the right eye 2 (two) times a day., Disp: , Rfl:     Allergies:  No Known Allergies    Social Hx:  Social History     Tobacco Use    Smoking status: Never     Passive exposure: Never    Smokeless tobacco: Never   Vaping Use    Vaping status: Never Used   Substance Use Topics    Alcohol use: No    Drug use: No       Family Hx:  Family History   Problem Relation Age of Onset    Heart disease Mother     COPD Mother     Pancreatic cancer Father     Heart disease Maternal Uncle        Review of Imaging:  Carotid duplex from [facility] dated 3/27/2025, as well as a CT angiogram from [facility] dated 5/22/2025 were reviewed along with their corresponding radiologic reports.  Comparison is made to a report from a prior CT angiogram from 6/2/2017.  There is calcific plaque at the bilateral carotid bifurcations (right greater than left), but I feel that the calcific nature of the plaque is overestimating the severity of stenosis.  This is corroborated with the carotid duplex examination which demonstrates peak velocities within the right carotid vasculature of 177/18 cm/s,  "with an ICA/CCA ratio of 2.4.  Peak velocities within the left carotid vasculature are 120/26 cm/s, with a ratio of 1.6.  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  The left vertebral artery is widely patent with robust flow to the intracranial vertebrobasilar system.    Physical Examination:  Vitals:    06/09/25 1401   BP: 160/70   Pulse: 60   Temp: 97 °F (36.1 °C)   SpO2: 97%        General Appearance:   Well developed, well nourished, well groomed, alert, and cooperative.    Neurological examination:  Alert and oriented x 3.  Nonfocal neurologic exam.  Speech is clear.  No facial droop.  Strength is symmetric in the upper and lower extremities.  She ambulates unassisted.    Diagnoses/Plan:    Ms. Corrales is a 79 y.o. female with incidental, asymptomatic right carotid stenosis found during evaluation of a \"syncopal\" episode.  In reviewing the outside CT angiogram along with carotid duplex from [facility], feel that there is a 50% (or less) stenosis of the right carotid vasculature and a 20% or less stenosis involving the left carotid vasculature.  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  I discussed these findings in detail with Ms. Corrales, and gave her the option of continued medical management with follow-up carotid duplex in 12 months time, versus catheter angiogram to definitively evaluate the severity of her carotid occlusive disease.  She has opted for the former, which I think is certainly reasonable.  I gave her the option of following up with me in 12 months time or continued follow-up with Dr. Franks, and she would like to follow-up closer to home with Dr. Franks, which again is certainly reasonable.  I do think she would benefit from continued aspirin/statin therapy.  I plan on seeing her back on a as needed basis.            "

## 2025-08-25 ENCOUNTER — TELEPHONE (OUTPATIENT)
Dept: NEUROSURGERY | Facility: CLINIC | Age: 80
End: 2025-08-25
Payer: MEDICARE